# Patient Record
Sex: FEMALE | Race: WHITE | NOT HISPANIC OR LATINO | Employment: UNEMPLOYED | URBAN - METROPOLITAN AREA
[De-identification: names, ages, dates, MRNs, and addresses within clinical notes are randomized per-mention and may not be internally consistent; named-entity substitution may affect disease eponyms.]

---

## 2017-02-03 DIAGNOSIS — J30.9 ALLERGIC RHINITIS: ICD-10-CM

## 2017-02-03 RX ORDER — FLUTICASONE PROPIONATE 50 MCG
SPRAY, SUSPENSION (ML) NASAL
Qty: 48 G | Refills: 3 | Status: SHIPPED | OUTPATIENT
Start: 2017-02-03 | End: 2019-04-08 | Stop reason: SDUPTHER

## 2017-02-20 ENCOUNTER — TELEPHONE (OUTPATIENT)
Dept: INTERNAL MEDICINE | Facility: CLINIC | Age: 57
End: 2017-02-20

## 2017-02-20 DIAGNOSIS — R42 VERTIGO: Primary | ICD-10-CM

## 2017-02-20 RX ORDER — MECLIZINE HYDROCHLORIDE 25 MG/1
25 TABLET ORAL 3 TIMES DAILY PRN
Qty: 50 TABLET | Refills: 2 | Status: SHIPPED | OUTPATIENT
Start: 2017-02-20 | End: 2017-02-20 | Stop reason: SDUPTHER

## 2017-02-20 RX ORDER — MECLIZINE HYDROCHLORIDE 25 MG/1
25 TABLET ORAL 3 TIMES DAILY PRN
Qty: 50 TABLET | Refills: 2 | Status: SHIPPED | OUTPATIENT
Start: 2017-02-20 | End: 2017-11-29

## 2017-02-20 RX ORDER — ONDANSETRON 4 MG/1
4 TABLET, ORALLY DISINTEGRATING ORAL EVERY 6 HOURS PRN
Qty: 12 TABLET | Refills: 1 | Status: SHIPPED | OUTPATIENT
Start: 2017-02-20 | End: 2017-11-29

## 2017-02-20 RX ORDER — ONDANSETRON 4 MG/1
4 TABLET, ORALLY DISINTEGRATING ORAL EVERY 6 HOURS PRN
Qty: 12 TABLET | Refills: 1 | Status: SHIPPED | OUTPATIENT
Start: 2017-02-20 | End: 2017-02-20 | Stop reason: SDUPTHER

## 2017-02-20 NOTE — TELEPHONE ENCOUNTER
----- Message from Brooke Mercer sent at 2/20/2017  8:04 AM CST -----  Contact: Asad [phone]  C/o vomiting and feeling dizzy symptoms started Saturday want to know can you call her something in to [facility] in [place].

## 2017-04-07 DIAGNOSIS — Z12.31 OTHER SCREENING MAMMOGRAM: ICD-10-CM

## 2017-06-27 ENCOUNTER — PATIENT OUTREACH (OUTPATIENT)
Dept: ADMINISTRATIVE | Facility: HOSPITAL | Age: 57
End: 2017-06-27

## 2017-06-27 NOTE — PROGRESS NOTES
Phoned patient to confirm PCP and schedule office visit. Left message asking for return call to confirm pcp and schedule f/u visit.

## 2017-09-01 DIAGNOSIS — E11.9 TYPE 2 DIABETES MELLITUS WITHOUT COMPLICATION: ICD-10-CM

## 2017-09-03 RX ORDER — METFORMIN HYDROCHLORIDE 500 MG/1
TABLET ORAL
Qty: 180 TABLET | Refills: 2 | OUTPATIENT
Start: 2017-09-03

## 2017-11-03 DIAGNOSIS — E03.9 HYPOTHYROIDISM: ICD-10-CM

## 2017-11-03 DIAGNOSIS — Z12.11 COLON CANCER SCREENING: ICD-10-CM

## 2017-11-03 RX ORDER — LEVOTHYROXINE SODIUM 50 UG/1
TABLET ORAL
Qty: 90 TABLET | Refills: 3 | OUTPATIENT
Start: 2017-11-03

## 2017-11-03 RX ORDER — IBUPROFEN 800 MG/1
TABLET ORAL
Qty: 360 TABLET | Refills: 3 | OUTPATIENT
Start: 2017-11-03

## 2017-11-20 ENCOUNTER — TELEPHONE (OUTPATIENT)
Dept: INTERNAL MEDICINE | Facility: CLINIC | Age: 57
End: 2017-11-20

## 2017-11-20 DIAGNOSIS — Z00.00 ROUTINE GENERAL MEDICAL EXAMINATION AT A HEALTH CARE FACILITY: Primary | ICD-10-CM

## 2017-11-20 NOTE — TELEPHONE ENCOUNTER
----- Message from Claudette Banda sent at 11/20/2017  3:28 PM CST -----  Contact: The pt called  She is scheduled to see you on 11-29-17 for her check up.  She is requesting orders be put in so that she can go to the lab on tomorrow morning for the blood work.  Want labs done at [facility]

## 2017-11-29 ENCOUNTER — OFFICE VISIT (OUTPATIENT)
Dept: INTERNAL MEDICINE | Facility: CLINIC | Age: 57
End: 2017-11-29
Payer: COMMERCIAL

## 2017-11-29 VITALS
BODY MASS INDEX: 57.13 KG/M2 | HEART RATE: 80 BPM | HEIGHT: 60 IN | RESPIRATION RATE: 20 BRPM | WEIGHT: 291 LBS | DIASTOLIC BLOOD PRESSURE: 80 MMHG | SYSTOLIC BLOOD PRESSURE: 136 MMHG | TEMPERATURE: 97 F

## 2017-11-29 DIAGNOSIS — Z00.00 ROUTINE GENERAL MEDICAL EXAMINATION AT A HEALTH CARE FACILITY: Primary | ICD-10-CM

## 2017-11-29 DIAGNOSIS — E78.2 MIXED HYPERLIPIDEMIA: ICD-10-CM

## 2017-11-29 DIAGNOSIS — E11.9 TYPE 2 DIABETES MELLITUS WITHOUT COMPLICATION, WITHOUT LONG-TERM CURRENT USE OF INSULIN: ICD-10-CM

## 2017-11-29 DIAGNOSIS — J20.9 ACUTE BRONCHITIS, UNSPECIFIED ORGANISM: ICD-10-CM

## 2017-11-29 DIAGNOSIS — I10 ESSENTIAL HYPERTENSION, BENIGN: ICD-10-CM

## 2017-11-29 DIAGNOSIS — E03.9 HYPOTHYROIDISM, UNSPECIFIED TYPE: ICD-10-CM

## 2017-11-29 PROCEDURE — 90471 IMMUNIZATION ADMIN: CPT | Mod: S$GLB,,, | Performed by: INTERNAL MEDICINE

## 2017-11-29 PROCEDURE — 99999 PR PBB SHADOW E&M-EST. PATIENT-LVL III: CPT | Mod: PBBFAC,,, | Performed by: INTERNAL MEDICINE

## 2017-11-29 PROCEDURE — 99396 PREV VISIT EST AGE 40-64: CPT | Mod: 25,S$GLB,, | Performed by: INTERNAL MEDICINE

## 2017-11-29 PROCEDURE — 90472 IMMUNIZATION ADMIN EACH ADD: CPT | Mod: S$GLB,,, | Performed by: INTERNAL MEDICINE

## 2017-11-29 PROCEDURE — 90732 PPSV23 VACC 2 YRS+ SUBQ/IM: CPT | Mod: S$GLB,,, | Performed by: INTERNAL MEDICINE

## 2017-11-29 PROCEDURE — 90686 IIV4 VACC NO PRSV 0.5 ML IM: CPT | Mod: S$GLB,,, | Performed by: INTERNAL MEDICINE

## 2017-11-29 RX ORDER — PREDNISONE 20 MG/1
TABLET ORAL
Qty: 14 TABLET | Refills: 0 | Status: SHIPPED | OUTPATIENT
Start: 2017-11-29 | End: 2017-12-22 | Stop reason: ALTCHOICE

## 2017-11-29 RX ORDER — LEVOTHYROXINE SODIUM 50 UG/1
50 TABLET ORAL DAILY
Qty: 90 TABLET | Refills: 3 | Status: SHIPPED | OUTPATIENT
Start: 2017-11-29 | End: 2018-11-23 | Stop reason: SDUPTHER

## 2017-11-29 RX ORDER — CODEINE PHOSPHATE AND GUAIFENESIN 10; 100 MG/5ML; MG/5ML
5 SOLUTION ORAL 3 TIMES DAILY PRN
Qty: 180 ML | Refills: 0 | Status: SHIPPED | OUTPATIENT
Start: 2017-11-29 | End: 2017-12-09

## 2017-11-29 RX ORDER — SIMVASTATIN 40 MG/1
40 TABLET, FILM COATED ORAL NIGHTLY
Qty: 90 TABLET | Refills: 3 | Status: SHIPPED | OUTPATIENT
Start: 2017-11-29 | End: 2018-11-23 | Stop reason: SDUPTHER

## 2017-11-29 RX ORDER — LISINOPRIL 20 MG/1
20 TABLET ORAL DAILY
Qty: 90 TABLET | Refills: 3 | Status: SHIPPED | OUTPATIENT
Start: 2017-11-29 | End: 2018-11-23 | Stop reason: SDUPTHER

## 2017-11-29 NOTE — PROGRESS NOTES
Subjective:       Patient ID: Mela Manriquez is a 57 y.o. female.    Chief Complaint: Follow-up; Results; Flu Vaccine; Cough (x3 days); Sinus Problem; and Sore Throat    HPI  Wellness visit.  Labs reviewed.  Pt with 3 days of nasal congestion, coryza and prod cough.  No F/C, SOB.  No pedal paresthesias.  Eye exam, mammo, pap overdue.  Review of Systems   All other systems reviewed and are negative.      Objective:      Physical Exam   Constitutional: She appears well-developed. No distress.   obese   HENT:   Head: Normocephalic.   Eyes: EOM are normal.   Neck: Normal range of motion. No tracheal deviation present.   Cardiovascular: Normal rate, regular rhythm, normal heart sounds and intact distal pulses.    Pulses:       Dorsalis pedis pulses are 2+ on the right side, and 2+ on the left side.        Posterior tibial pulses are 2+ on the right side, and 2+ on the left side.   Pulmonary/Chest: Effort normal. No respiratory distress. She has wheezes (end exp all fields).   Abdominal: Soft. Bowel sounds are normal. She exhibits no distension. There is no tenderness.   Musculoskeletal: Normal range of motion. She exhibits no edema.        Right foot: There is normal range of motion and no deformity.        Left foot: There is normal range of motion and no deformity.   Feet:   Right Foot:   Protective Sensation: 6 sites tested. 6 sites sensed.   Skin Integrity: Positive for callus and dry skin.   Left Foot:   Protective Sensation: 6 sites tested. 6 sites sensed.   Skin Integrity: Positive for callus and dry skin.   Neurological: She is alert. No cranial nerve deficit. She exhibits normal muscle tone. Coordination normal.   Skin: Skin is warm and dry. No rash noted. She is not diaphoretic. No erythema.   Psychiatric: She has a normal mood and affect. Her behavior is normal.   Vitals reviewed.      Assessment:       1. Routine general medical examination at a health care facility    2. Essential hypertension, benign     3. Mixed hyperlipidemia    4. Hypothyroidism, unspecified type    5. Type 2 diabetes mellitus without complication, without long-term current use of insulin    6. Acute bronchitis, unspecified organism        Plan:       Mela was seen today for follow-up, results, flu vaccine, cough, sinus problem and sore throat.    Diagnoses and all orders for this visit:    Routine general medical examination at a health care facility  -     Influenza - Quadrivalent (3 years & older) (PF)  -     Pneumococcal Polysaccharide Vaccine (23 Valent) (SQ/IM)    Essential hypertension, benign  -     lisinopril (PRINIVIL,ZESTRIL) 20 MG tablet; Take 1 tablet (20 mg total) by mouth once daily.   Well-cont    Mixed hyperlipidemia  -     simvastatin (ZOCOR) 40 MG tablet; Take 1 tablet (40 mg total) by mouth every evening.  -     Lipid panel; Future    Hypothyroidism, unspecified type  -     levothyroxine (SYNTHROID) 50 MCG tablet; Take 1 tablet (50 mcg total) by mouth once daily.   Euthyroid    Type 2 diabetes mellitus without complication, without long-term current use of insulin  -     Hemoglobin A1c; Future   D/C metformin    Acute bronchitis, unspecified organism  -     guaifenesin-codeine 100-10 mg/5 ml (CHERATUSSIN AC)  mg/5 mL syrup; Take 5 mLs by mouth 3 (three) times daily as needed for Cough.  -     predniSONE (DELTASONE) 20 MG tablet; 2 tabs po qd x 7 days      Return in about 3 months (around 2/28/2018).

## 2017-12-22 ENCOUNTER — OFFICE VISIT (OUTPATIENT)
Dept: INTERNAL MEDICINE | Facility: CLINIC | Age: 57
End: 2017-12-22
Payer: COMMERCIAL

## 2017-12-22 VITALS
SYSTOLIC BLOOD PRESSURE: 136 MMHG | TEMPERATURE: 98 F | HEART RATE: 88 BPM | HEIGHT: 60 IN | WEIGHT: 293 LBS | DIASTOLIC BLOOD PRESSURE: 78 MMHG | BODY MASS INDEX: 57.52 KG/M2 | RESPIRATION RATE: 20 BRPM

## 2017-12-22 DIAGNOSIS — J06.9 UPPER RESPIRATORY TRACT INFECTION, UNSPECIFIED TYPE: ICD-10-CM

## 2017-12-22 DIAGNOSIS — J06.9 UPPER RESPIRATORY TRACT INFECTION, UNSPECIFIED TYPE: Primary | ICD-10-CM

## 2017-12-22 PROCEDURE — 99999 PR PBB SHADOW E&M-EST. PATIENT-LVL III: CPT | Mod: PBBFAC,,, | Performed by: INTERNAL MEDICINE

## 2017-12-22 PROCEDURE — 99213 OFFICE O/P EST LOW 20 MIN: CPT | Mod: S$GLB,,, | Performed by: INTERNAL MEDICINE

## 2017-12-22 RX ORDER — TOBRAMYCIN 3 MG/ML
SOLUTION/ DROPS OPHTHALMIC
COMMUNITY
Start: 2017-12-18 | End: 2018-03-13

## 2017-12-22 RX ORDER — AMOXICILLIN AND CLAVULANATE POTASSIUM 875; 125 MG/1; MG/1
TABLET, FILM COATED ORAL
COMMUNITY
Start: 2017-12-18 | End: 2018-03-13

## 2017-12-22 RX ORDER — FLUTICASONE PROPIONATE AND SALMETEROL 100; 50 UG/1; UG/1
1 POWDER RESPIRATORY (INHALATION) 2 TIMES DAILY
Qty: 180 EACH | Refills: 3 | Status: SHIPPED | OUTPATIENT
Start: 2017-12-22 | End: 2017-12-22

## 2017-12-22 RX ORDER — FLUTICASONE PROPIONATE 110 UG/1
1 AEROSOL, METERED RESPIRATORY (INHALATION) 2 TIMES DAILY
Qty: 12 G | Refills: 2 | Status: SHIPPED | OUTPATIENT
Start: 2017-12-22 | End: 2018-03-13

## 2017-12-22 RX ORDER — FLUTICASONE PROPIONATE AND SALMETEROL 100; 50 UG/1; UG/1
1 POWDER RESPIRATORY (INHALATION) 2 TIMES DAILY
Qty: 60 EACH | Refills: 3 | Status: SHIPPED | OUTPATIENT
Start: 2017-12-22 | End: 2017-12-22 | Stop reason: SDUPTHER

## 2017-12-22 NOTE — PROGRESS NOTES
Subjective:       Patient ID: Mela Manriquez is a 57 y.o. female.    Chief Complaint: Follow-up (recently seen at urgent care for bronchitis)    HPI  Pt with 2 weeks of nasal congestion, coryza, prod cough.  No f/C, SOB.  No response to Augmentin, but got diarrhea from it.  Review of Systems    Objective:      Physical Exam   Constitutional: She appears well-developed. No distress.   obese   HENT:   Head: Normocephalic.   Mouth/Throat: Oropharynx is clear and moist.   L max sinus tender   Eyes: EOM are normal.   Neck: Normal range of motion. No tracheal deviation present.   Cardiovascular: Normal rate, regular rhythm, normal heart sounds and intact distal pulses.    Pulmonary/Chest: Effort normal. No respiratory distress. She has wheezes (scattered).   Abdominal: She exhibits no distension.   Musculoskeletal: Normal range of motion. She exhibits no edema.   Neurological: She is alert. No cranial nerve deficit. She exhibits normal muscle tone. Coordination normal.   Skin: Skin is warm and dry. No rash noted. She is not diaphoretic. No erythema.   Psychiatric: She has a normal mood and affect. Her behavior is normal.   Vitals reviewed.      Assessment:       1. Upper respiratory tract infection, unspecified type        Plan:       Mela was seen today for follow-up.    Diagnoses and all orders for this visit:    Upper respiratory tract infection, unspecified type  -     fluticasone-salmeterol 100-50 mcg/dose (ADVAIR) 100-50 mcg/dose diskus inhaler; Inhale 1 puff into the lungs 2 (two) times daily.      Return if symptoms worsen or fail to improve.

## 2018-03-13 ENCOUNTER — TELEPHONE (OUTPATIENT)
Dept: INTERNAL MEDICINE | Facility: CLINIC | Age: 58
End: 2018-03-13

## 2018-03-13 ENCOUNTER — OFFICE VISIT (OUTPATIENT)
Dept: INTERNAL MEDICINE | Facility: CLINIC | Age: 58
End: 2018-03-13
Payer: COMMERCIAL

## 2018-03-13 VITALS
HEART RATE: 76 BPM | RESPIRATION RATE: 16 BRPM | BODY MASS INDEX: 57.07 KG/M2 | WEIGHT: 292.25 LBS | SYSTOLIC BLOOD PRESSURE: 136 MMHG | DIASTOLIC BLOOD PRESSURE: 84 MMHG | TEMPERATURE: 98 F

## 2018-03-13 DIAGNOSIS — M79.7 FIBROMYALGIA: ICD-10-CM

## 2018-03-13 DIAGNOSIS — J30.1 CHRONIC SEASONAL ALLERGIC RHINITIS DUE TO POLLEN: ICD-10-CM

## 2018-03-13 DIAGNOSIS — E66.01 MORBID OBESITY, UNSPECIFIED OBESITY TYPE: ICD-10-CM

## 2018-03-13 DIAGNOSIS — Z00.00 ROUTINE GENERAL MEDICAL EXAMINATION AT A HEALTH CARE FACILITY: Primary | ICD-10-CM

## 2018-03-13 DIAGNOSIS — I10 ESSENTIAL HYPERTENSION, BENIGN: ICD-10-CM

## 2018-03-13 DIAGNOSIS — M79.7 FIBROMYALGIA: Primary | ICD-10-CM

## 2018-03-13 DIAGNOSIS — E03.9 HYPOTHYROIDISM, UNSPECIFIED TYPE: ICD-10-CM

## 2018-03-13 DIAGNOSIS — E78.2 MIXED HYPERLIPIDEMIA: ICD-10-CM

## 2018-03-13 DIAGNOSIS — R73.03 PREDIABETES: ICD-10-CM

## 2018-03-13 PROCEDURE — 99396 PREV VISIT EST AGE 40-64: CPT | Mod: 25,S$GLB,, | Performed by: INTERNAL MEDICINE

## 2018-03-13 PROCEDURE — 90715 TDAP VACCINE 7 YRS/> IM: CPT | Mod: S$GLB,,, | Performed by: INTERNAL MEDICINE

## 2018-03-13 PROCEDURE — 99999 PR PBB SHADOW E&M-EST. PATIENT-LVL III: CPT | Mod: PBBFAC,,, | Performed by: INTERNAL MEDICINE

## 2018-03-13 PROCEDURE — 90471 IMMUNIZATION ADMIN: CPT | Mod: S$GLB,,, | Performed by: INTERNAL MEDICINE

## 2018-03-13 RX ORDER — AMOXICILLIN 500 MG
1 CAPSULE ORAL DAILY
COMMUNITY

## 2018-03-13 RX ORDER — DULOXETIN HYDROCHLORIDE 60 MG/1
60 CAPSULE, DELAYED RELEASE ORAL DAILY
Qty: 90 CAPSULE | Refills: 4 | Status: SHIPPED | OUTPATIENT
Start: 2018-03-13 | End: 2018-09-27

## 2018-03-13 RX ORDER — CEPHALEXIN 250 MG/1
CAPSULE ORAL
COMMUNITY
Start: 2018-03-08 | End: 2018-12-03

## 2018-03-13 NOTE — TELEPHONE ENCOUNTER
----- Message from Gracie Hart sent at 3/13/2018 12:18 PM CDT -----  Contact: [phone] ref [id number] [facility]  Pharmacy would like to speak with you about changing pt's medication milnacipran (SAVELLA) 25 mg Tab tablet. Please advise.

## 2018-03-20 ENCOUNTER — PATIENT MESSAGE (OUTPATIENT)
Dept: INTERNAL MEDICINE | Facility: CLINIC | Age: 58
End: 2018-03-20

## 2018-05-04 RX ORDER — IBUPROFEN 800 MG/1
800 TABLET ORAL 3 TIMES DAILY
Qty: 360 TABLET | Refills: 3 | Status: ON HOLD | OUTPATIENT
Start: 2018-05-04 | End: 2018-11-30 | Stop reason: SDUPTHER

## 2018-06-08 DIAGNOSIS — Z12.39 BREAST CANCER SCREENING: ICD-10-CM

## 2018-09-18 ENCOUNTER — PATIENT MESSAGE (OUTPATIENT)
Dept: INTERNAL MEDICINE | Facility: CLINIC | Age: 58
End: 2018-09-18

## 2018-09-27 ENCOUNTER — OFFICE VISIT (OUTPATIENT)
Dept: INTERNAL MEDICINE | Facility: CLINIC | Age: 58
End: 2018-09-27
Payer: COMMERCIAL

## 2018-09-27 VITALS
SYSTOLIC BLOOD PRESSURE: 130 MMHG | BODY MASS INDEX: 59.18 KG/M2 | WEIGHT: 293 LBS | DIASTOLIC BLOOD PRESSURE: 80 MMHG | HEART RATE: 88 BPM

## 2018-09-27 DIAGNOSIS — E78.2 MIXED HYPERLIPIDEMIA: ICD-10-CM

## 2018-09-27 DIAGNOSIS — Z00.00 ROUTINE GENERAL MEDICAL EXAMINATION AT A HEALTH CARE FACILITY: ICD-10-CM

## 2018-09-27 DIAGNOSIS — Z23 NEED FOR PROPHYLACTIC VACCINATION AND INOCULATION AGAINST INFLUENZA: ICD-10-CM

## 2018-09-27 DIAGNOSIS — M79.7 FIBROMYALGIA: Primary | ICD-10-CM

## 2018-09-27 DIAGNOSIS — E03.9 HYPOTHYROIDISM, UNSPECIFIED TYPE: ICD-10-CM

## 2018-09-27 DIAGNOSIS — I10 ESSENTIAL HYPERTENSION, BENIGN: ICD-10-CM

## 2018-09-27 DIAGNOSIS — J30.1 CHRONIC SEASONAL ALLERGIC RHINITIS DUE TO POLLEN: ICD-10-CM

## 2018-09-27 DIAGNOSIS — R73.03 PREDIABETES: ICD-10-CM

## 2018-09-27 DIAGNOSIS — E66.01 MORBID OBESITY, UNSPECIFIED OBESITY TYPE: ICD-10-CM

## 2018-09-27 PROCEDURE — 90471 IMMUNIZATION ADMIN: CPT | Mod: S$GLB,,, | Performed by: INTERNAL MEDICINE

## 2018-09-27 PROCEDURE — 99214 OFFICE O/P EST MOD 30 MIN: CPT | Mod: 25,S$GLB,, | Performed by: INTERNAL MEDICINE

## 2018-09-27 PROCEDURE — 3079F DIAST BP 80-89 MM HG: CPT | Mod: CPTII,S$GLB,, | Performed by: INTERNAL MEDICINE

## 2018-09-27 PROCEDURE — 3008F BODY MASS INDEX DOCD: CPT | Mod: CPTII,S$GLB,, | Performed by: INTERNAL MEDICINE

## 2018-09-27 PROCEDURE — 99999 PR PBB SHADOW E&M-EST. PATIENT-LVL III: CPT | Mod: PBBFAC,,, | Performed by: INTERNAL MEDICINE

## 2018-09-27 PROCEDURE — 90686 IIV4 VACC NO PRSV 0.5 ML IM: CPT | Mod: S$GLB,,, | Performed by: INTERNAL MEDICINE

## 2018-09-27 PROCEDURE — 3075F SYST BP GE 130 - 139MM HG: CPT | Mod: CPTII,S$GLB,, | Performed by: INTERNAL MEDICINE

## 2018-09-27 RX ORDER — METHYLPREDNISOLONE 4 MG/1
TABLET ORAL
Qty: 1 PACKAGE | Refills: 0 | Status: SHIPPED | OUTPATIENT
Start: 2018-09-27 | End: 2018-10-22

## 2018-09-27 RX ORDER — METHYLPREDNISOLONE 4 MG/1
TABLET ORAL
Qty: 1 PACKAGE | Refills: 0 | Status: SHIPPED | OUTPATIENT
Start: 2018-09-27 | End: 2018-09-27 | Stop reason: SDUPTHER

## 2018-09-27 NOTE — PROGRESS NOTES
Subjective:       Patient ID: Mela Manriquez is a 58 y.o. female.    Chief Complaint: Follow-up    HPI  Checkup.  Labs reviewed.  Still with fibrositis pain, intolerant of Cymbalta.  C/O nasal congestion, hoarseness, cough x 2 weeks, no F/C.  Review of Systems   All other systems reviewed and are negative.      Objective:      Physical Exam   Constitutional: She appears well-developed. No distress.   obese   HENT:   Head: Normocephalic.   Nasal mucosa inflamed   Eyes: EOM are normal.   Neck: Normal range of motion. No tracheal deviation present.   Cardiovascular: Normal rate, regular rhythm, normal heart sounds and intact distal pulses.   Pulmonary/Chest: Effort normal and breath sounds normal. No respiratory distress.   Abdominal: She exhibits no distension.   Musculoskeletal: Normal range of motion. She exhibits no edema.   Neurological: She is alert. No cranial nerve deficit. She exhibits normal muscle tone. Coordination normal.   Skin: Skin is warm and dry. No rash noted. She is not diaphoretic. No erythema.   Psychiatric: She has a normal mood and affect. Her behavior is normal.   Vitals reviewed.      Assessment:       1. Fibromyalgia    2. Essential hypertension, benign    3. Hypothyroidism, unspecified type    4. Mixed hyperlipidemia    5. Morbid obesity, unspecified obesity type    6. Chronic seasonal allergic rhinitis due to pollen    7. Prediabetes    8. Need for prophylactic vaccination and inoculation against influenza    9. Routine general medical examination at a health care facility        Plan:       Mela was seen today for follow-up.    Diagnoses and all orders for this visit:    Fibromyalgia  -     milnacipran (SAVELLA) 25 mg Tab tablet; Take 1 tablet (25 mg total) by mouth 2 (two) times daily.    Essential hypertension, benign   Well-cont    Hypothyroidism, unspecified type   Euthyroid    Mixed hyperlipidemia  -     Lipid panel; Future    Morbid obesity, unspecified obesity type  -      Ambulatory Referral to [facility]    Chronic seasonal allergic rhinitis due to pollen  -     methylPREDNISolone (MEDROL DOSEPACK) 4 mg tablet; use as directed    Prediabetes  -     Hemoglobin A1c; Future    Need for prophylactic vaccination and inoculation against influenza  -     Influenza - Quadrivalent (3 years & older) (PF)    Routine general medical examination at a health care facility  -     Ambulatory referral to Gynecology      Follow-up in about 6 months (around 3/27/2019).

## 2018-10-02 ENCOUNTER — TELEPHONE (OUTPATIENT)
Dept: INTERNAL MEDICINE | Facility: CLINIC | Age: 58
End: 2018-10-02

## 2018-10-02 DIAGNOSIS — M79.7 FIBROMYALGIA: Primary | ICD-10-CM

## 2018-10-02 RX ORDER — GABAPENTIN 300 MG/1
300 CAPSULE ORAL 3 TIMES DAILY
Qty: 90 CAPSULE | Refills: 3 | Status: SHIPPED | OUTPATIENT
Start: 2018-10-02 | End: 2018-11-19 | Stop reason: CLARIF

## 2018-10-02 NOTE — TELEPHONE ENCOUNTER
----- Message from Hilton Sales sent at 10/2/2018  3:14 PM CDT -----  Contact: donavon arauz [phone] ref [phone]  Pharmacist advised the milnacipran (SAVELLA) 25 mg Tab tablet is not covered under the patient insurance and advised the following are    Paroxetine HCL  Gabapentin Caps or Tabs  Citalopram HBR  Sertraline hcl  Paroxetine HCL CR TABS  Escitalopram Tabs

## 2018-10-03 ENCOUNTER — HOSPITAL ENCOUNTER (OUTPATIENT)
Dept: RADIOLOGY | Facility: HOSPITAL | Age: 58
Discharge: HOME | End: 2018-10-03
Attending: INTERNAL MEDICINE
Payer: COMMERCIAL

## 2018-10-03 ENCOUNTER — TELEPHONE (OUTPATIENT)
Dept: FAMILY MEDICINE | Facility: CLINIC | Age: 58
End: 2018-10-03

## 2018-10-03 ENCOUNTER — PROCEDURE VISIT (OUTPATIENT)
Dept: OBSTETRICS AND GYNECOLOGY | Facility: CLINIC | Age: 58
End: 2018-10-03
Payer: COMMERCIAL

## 2018-10-03 ENCOUNTER — TELEPHONE (OUTPATIENT)
Dept: INTERNAL MEDICINE | Facility: CLINIC | Age: 58
End: 2018-10-03

## 2018-10-03 ENCOUNTER — OFFICE VISIT (OUTPATIENT)
Dept: OBSTETRICS AND GYNECOLOGY | Facility: CLINIC | Age: 58
End: 2018-10-03
Payer: COMMERCIAL

## 2018-10-03 VITALS
WEIGHT: 293 LBS | SYSTOLIC BLOOD PRESSURE: 150 MMHG | HEIGHT: 60 IN | BODY MASS INDEX: 57.52 KG/M2 | DIASTOLIC BLOOD PRESSURE: 94 MMHG

## 2018-10-03 DIAGNOSIS — Z12.4 PAP SMEAR FOR CERVICAL CANCER SCREENING: ICD-10-CM

## 2018-10-03 DIAGNOSIS — Z12.39 BREAST CANCER SCREENING: ICD-10-CM

## 2018-10-03 DIAGNOSIS — N95.0 PMB (POSTMENOPAUSAL BLEEDING): ICD-10-CM

## 2018-10-03 DIAGNOSIS — Z01.419 ENCOUNTER FOR ANNUAL ROUTINE GYNECOLOGICAL EXAMINATION: Primary | ICD-10-CM

## 2018-10-03 PROCEDURE — 88175 CYTOPATH C/V AUTO FLUID REDO: CPT | Performed by: PATHOLOGY

## 2018-10-03 PROCEDURE — 88305 TISSUE EXAM BY PATHOLOGIST: CPT | Performed by: PATHOLOGY

## 2018-10-03 PROCEDURE — 3080F DIAST BP >= 90 MM HG: CPT | Mod: CPTII,S$GLB,, | Performed by: OBSTETRICS & GYNECOLOGY

## 2018-10-03 PROCEDURE — 88305 TISSUE EXAM BY PATHOLOGIST: CPT | Mod: 26,,, | Performed by: PATHOLOGY

## 2018-10-03 PROCEDURE — 99386 PREV VISIT NEW AGE 40-64: CPT | Mod: 25,S$GLB,, | Performed by: OBSTETRICS & GYNECOLOGY

## 2018-10-03 PROCEDURE — 77067 SCR MAMMO BI INCL CAD: CPT | Mod: 26,,, | Performed by: RADIOLOGY

## 2018-10-03 PROCEDURE — 76830 TRANSVAGINAL US NON-OB: CPT | Mod: S$GLB,,, | Performed by: OBSTETRICS & GYNECOLOGY

## 2018-10-03 PROCEDURE — 77063 BREAST TOMOSYNTHESIS BI: CPT | Mod: TC

## 2018-10-03 PROCEDURE — 77063 BREAST TOMOSYNTHESIS BI: CPT | Mod: 26,,, | Performed by: RADIOLOGY

## 2018-10-03 PROCEDURE — 3077F SYST BP >= 140 MM HG: CPT | Mod: CPTII,S$GLB,, | Performed by: OBSTETRICS & GYNECOLOGY

## 2018-10-03 PROCEDURE — 58100 BIOPSY OF UTERUS LINING: CPT | Mod: S$GLB,,, | Performed by: OBSTETRICS & GYNECOLOGY

## 2018-10-03 PROCEDURE — 87624 HPV HI-RISK TYP POOLED RSLT: CPT

## 2018-10-03 PROCEDURE — 99999 PR PBB SHADOW E&M-EST. PATIENT-LVL III: CPT | Mod: PBBFAC,,, | Performed by: OBSTETRICS & GYNECOLOGY

## 2018-10-03 PROCEDURE — 88141 CYTOPATH C/V INTERPRET: CPT | Mod: ,,, | Performed by: PATHOLOGY

## 2018-10-03 NOTE — PROGRESS NOTES
Chief Complaint   Patient presents with    Well Woman       HISTORY OF PRESENT ILLNESS:   Mela Manriquez is a 58 y.o. female  who presents for well woman exam.  No LMP recorded. Patient is postmenopausal.. Menopause at age 48  She has complains of vaginal bleeding that has been going on and off since monday. Sometimes it is heavy with clots. Seems to only happen with urination. She has a history of kidney stones but isn't having pain now. Had normal colonoscopy 5 years ago.      Past Medical History:   Diagnosis Date    AR (allergic rhinitis)     Benign essential hypertension     Fibromyalgia     High cholesterol     Hypothyroidism           Past Surgical History:   Procedure Laterality Date    APPENDECTOMY      CHOLECYSTECTOMY      COLONOSCOPY  2013    SINUS SURGERY      TONSILLECTOMY           Social History     Socioeconomic History    Marital status:      Spouse name: Not on file    Number of children: Not on file    Years of education: Not on file    Highest education level: Not on file   Social Needs    Financial resource strain: Not on file    Food insecurity - worry: Not on file    Food insecurity - inability: Not on file    Transportation needs - medical: Not on file    Transportation needs - non-medical: Not on file   Occupational History    Not on file   Tobacco Use    Smoking status: Never Smoker    Smokeless tobacco: Never Used   Substance and Sexual Activity    Alcohol use: No    Drug use: No    Sexual activity: Yes     Partners: Male   Other Topics Concern    Not on file   Social History Narrative    Not on file       Family History   Problem Relation Age of Onset    Diabetes Mother     Diabetes Brother     Diabetes Paternal Grandfather          OB History    Para Term  AB Living   2 2 2     2   SAB TAB Ectopic Multiple Live Births                  # Outcome Date GA Lbr Johnny/2nd Weight Sex Delivery Anes PTL Lv   2 Term            1 Term                    COMPREHENSIVE GYN HISTORY:  PAP History: Denies abnormal Paps but last one was 10 years ago   Infection History: Denies STDs. Denies PID.  Benign History: Denies uterine fibroids. Denies ovarian cysts. Denies endometriosis Denies other conditions.  Cancer History: Denies cervical cancer. Denies uterine cancer or hyperplasia. Denies ovarian cancer. Denies vulvar cancer or pre-cancer. Denies vaginal cancer or pre-cancer. Denies breast cancer. Denies colon cancer.  Cycle: 12/mon.4-7 days     ROS:  GENERAL: Denies weight gain or weight loss. Feeling well overall.   SKIN: Denies rash or lesions.   HEAD: Denies headache.   NODES: Denies enlarged lymph nodes.   CHEST: Denies shortness of breath.   ABDOMEN: No abdominal pain, constipation, diarrhea, nausea, vomiting or rectal bleeding.   URINARY: No frequency, dysuria, hematuria, or burning on urination.  REPRODUCTIVE: See HPI.   BREASTS: The patient denies pain, lumps, or nipple discharge.       BP (!) 150/94   Ht 5' (1.524 m)   Wt (!) 137 kg (302 lb)   BMI 58.98 kg/m²     APPEARANCE: Well nourished, well developed, in no acute distress.  NECK: Neck symmetric without  thyromegaly.  NODES: No inguinal, cervical lymph node enlargement.  CHEST: Lungs clear to auscultation.  HEART: Regular rate and rhythm, no murmurs, rubs or gallops.  ABDOMEN: Soft. No tenderness or masses. No hernias. No hepatosplenomegaly.  BREASTS: Symmetrical, no skin changes or visible lesions. No palpable masses, nipple discharge or adenopathy bilaterally.  PELVIC: very limited exam due to habitus  VULVA: No lesions. Normal female genitalia.  URETHRAL MEATUS: Normal size and location, no lesions, no prolapse.  URETHRA: No masses, tenderness, prolapse or scarring.  VAGINA: Moist and well rugated, no discharge, no significant cystocele or rectocele.  CERVIX: No lesions and discharge. Moderate amount of watery blood and clot in the vault. Minimal active bleeding. No gross masses on the  cervix or polyps  UTERUS: couldn't appreciate  ADNEXA: No masses or tenderness.  PERINEUM: Normal, mo masses    Data Reviewed:     Lipid profile: Date:   Lab Results   Component Value Date    CHOL 201 (H) 2018    CHOL 185 2016    CHOL 212 (H) 2016     Lab Results   Component Value Date    HDL 45 2018    HDL 44 2016    HDL 48 2016     Lab Results   Component Value Date    LDLCALC 111.6 2018    LDLCALC 112.0 2016    LDLCALC 131.4 2016     Lab Results   Component Value Date    TRIG 222 (H) 2018    TRIG 145 2016    TRIG 163 (H) 2016     Lab Results   Component Value Date    CHOLHDL 22.4 2018    CHOLHDL 23.8 2016    CHOLHDL 22.6 2016     TSH:   Lab Results   Component Value Date    TSH 2.240 2018     Colonoscopy Date:     1. Encounter for annual routine gynecological examination    2. Pap smear for cervical cancer screening    3. PMB (postmenopausal bleeding)        A/P 1. Routine gyn annual exam. s/p normal breast exam and MMG ordered.  Pap with HPV cotesting ordered but discussed with her may need to repeat depending on sample.   2. Discussed the multiple etiology of PMB. We discussed the most common cause is atrophy of the uterine lining but we must evaluate for more serious causes like uterine cancer. I discussed with her that with the amount of bleeding I am concerned this is something serious like cancer or precancerous cells. We will get a TVUS and do EMB afterwards. We discussed the possibility of needing a D&C for diagnosis. Will also check a CBC.      US this afternoon showed 11x6x6 cm uterus with 3 cm EMS; Discussed with patient that we should do EMB but if no tissue sample then should do D&C as this is very concerning.     Date:10/3/2018  Time:2:22 PM  Name of the procedure: Endometrial Biopsy  Indications: Mela Manriquez is a 58 y.o. female  who presents today for endometrial biopsy secondary to  AUB.  No LMP recorded (lmp unknown). Patient is postmenopausal..    Patient consent: Risks/benefits of the procedure were discussed with the patient. Patient's questions were answered.  Consents signed.   TIME OUT completed.  Labs:   Office urine pregnancy test negative  Procedure: Speculum placed in vagina;  cervix swabbed with betadine x 2;  Utreus sounded to 6 cm.   Endometrial pipelle advanced without difficulty x 3 passes and adequate tissue obtained vs clot. Single tooth tenaculum removed.  Hemostasis achieved.    Complications: None  EBL: 5 cc  Disposition: Pt tolerated the procedure well.    A/P:   -Patient instructed to contact MD or report to emergency room for fever greater than 100.4F, vaginal bleeding greater than 2 pads/hour, severe abdominal pain not relieved with NSAIDs.

## 2018-10-03 NOTE — LETTER
October 3, 2018      Shahriar Desai MD  [address]  [address]  [place] [address]           [facility]  [address], [address]  [address] [facility]  [place] [address]  Phone: [phone]          Patient: Mela Manriquez   MR Number: [id number]   YOB: 1960   Date of Visit: 10/3/2018       Dear Dr. Shahriar Desai:    Thank you for referring Mela Manriquez to me for evaluation. Attached you will find relevant portions of my assessment and plan of care.    If you have questions, please do not hesitate to call me. I look forward to following Mela Manriquez along with you.    Sincerely,    Karma Call MD    Enclosure  CC:  No Recipients    If you would like to receive this communication electronically, please contact [email or web address] or [phone] to request more information on [employer] Link access.    For providers and/or their staff who would like to refer a patient to [facility], please contact us through our one-stop-shop provider referral line, [facility], at [phone].    If you feel you have received this communication in error or would no longer like to receive these types of communications, please e-mail [email or web address]

## 2018-10-03 NOTE — TELEPHONE ENCOUNTER
Spoke with pharmacy; clarified that Gabapentin was ordered to replace Savella. (Savella not covered by insurance)      ----- Message from Hilton Sales sent at 10/3/2018  3:24 PM CDT -----  Contact: donavon arauz [phone] ref [id number]  Pharmacist advised they can not hold the Rx pass today and need an answer by end of business today. Please see message from yesterday. Please call.

## 2018-10-09 ENCOUNTER — TELEPHONE (OUTPATIENT)
Dept: OBSTETRICS AND GYNECOLOGY | Facility: CLINIC | Age: 58
End: 2018-10-09

## 2018-10-09 DIAGNOSIS — C55 MALIGNANT NEOPLASM OF UTERUS, UNSPECIFIED SITE: Primary | ICD-10-CM

## 2018-10-09 NOTE — TELEPHONE ENCOUNTER
----- Message from Dolores Borges sent at 10/9/2018  4:18 PM CDT -----  Contact: [phone]/self  Patient states she's returning your call  Please call and advise

## 2018-10-09 NOTE — TELEPHONE ENCOUNTER
Pt calling back, states that the message said something about changing the GYN/ONC appt. Please advise

## 2018-10-09 NOTE — TELEPHONE ENCOUNTER
Called patient on the home and cell phone. Left a message with her  and on her cell phone. Her biopsy came back cancer. I would like to discuss this with her.  I sent a referral for gyn oncology so they should be calling her.

## 2018-10-09 NOTE — TELEPHONE ENCOUNTER
Called patient and we reviewed results. All questions answered. Sent a referral to gyn oncology and scheduled her an appointment.

## 2018-10-10 LAB
HPV HR 12 DNA CVX QL NAA+PROBE: NEGATIVE
HPV16 AG SPEC QL: NEGATIVE
HPV18 DNA SPEC QL NAA+PROBE: NEGATIVE

## 2018-10-10 NOTE — TELEPHONE ENCOUNTER
Spoke with pt. Pt was informed that gyn/onc would be reaching out to her. Pt was given the phone number to the department and will call if she has not heard anything in a couple of days.

## 2018-10-12 ENCOUNTER — TELEPHONE (OUTPATIENT)
Dept: GYNECOLOGIC ONCOLOGY | Facility: CLINIC | Age: 58
End: 2018-10-12

## 2018-10-19 ENCOUNTER — TELEPHONE (OUTPATIENT)
Dept: GYNECOLOGIC ONCOLOGY | Facility: CLINIC | Age: 58
End: 2018-10-19

## 2018-10-21 NOTE — PROGRESS NOTES
Subjective:      Patient ID: Mela Manriquez is a 58 y.o. female.    Chief Complaint: Malignant neoplasm of uterus (Consult)      HPI     Ms. Manriquez is a 58yr old para 2 (vag del x 2) referred from Dr. Call for Gr 1 endometrial cancer found on EMBx. Patient reports LMP at age 48, started bleeding on 10/1/2018 sometimes heavy with clots.    Pelvic US   Uterus 11 x 6 x 6cm with NATALIE 3.3cm  Bilateral ovaries not visualized    EMBx sound to 6cm, Endometrial adenocarcinoma, endometrioid type, FIGO grade 1.    Last pap smear 10/3/2018 Atypical glandular cells, favor a neoplastic process. No prior history for abnormal pap smears.    Surgical history: open appendectomy and cholecystectomy, 1976    Family history negative for breast, ovarian or colon CA. Niece - uterine vs cervical CA (30s), another niece - bone CA (24)    Review of Systems   Constitutional: Negative for appetite change (decreased w stress of diagnosis), chills, diaphoresis, fatigue, fever and unexpected weight change.   HENT: Negative for mouth sores and tinnitus.    Respiratory: Positive for chest tightness (chronic bronchitis). Negative for cough, shortness of breath and wheezing.    Cardiovascular: Negative for chest pain, palpitations and leg swelling.   Gastrointestinal: Negative for abdominal distention, abdominal pain, blood in stool, constipation, diarrhea, nausea and vomiting.   Genitourinary: Positive for vaginal bleeding (since 10/1). Negative for difficulty urinating, dysuria, flank pain, frequency, hematuria, pelvic pain, vaginal discharge and vaginal pain.   Musculoskeletal: Negative for arthralgias and back pain.   Skin: Negative for color change and rash.   Neurological: Negative for dizziness, weakness, numbness and headaches.   Hematological: Negative for adenopathy.   Psychiatric/Behavioral: Negative for confusion and sleep disturbance. The patient is nervous/anxious.        Past Medical History:   Diagnosis Date    AR (allergic  rhinitis)     Benign essential hypertension     Endometrial cancer, grade I 10/22/2018    Fibromyalgia     High cholesterol     Hypothyroidism      Past Surgical History:   Procedure Laterality Date    APPENDECTOMY  1976    open w cholecystectomy    CHOLECYSTECTOMY  1976    open    COLONOSCOPY  2013    SINUS SURGERY  1995    TONSILLECTOMY       Family History   Problem Relation Age of Onset    Diabetes Mother     Diabetes Brother     Diabetes Paternal Grandfather      Social History     Socioeconomic History    Marital status:      Spouse name: Not on file    Number of children: Not on file    Years of education: Not on file    Highest education level: Not on file   Social Needs    Financial resource strain: Not on file    Food insecurity - worry: Not on file    Food insecurity - inability: Not on file    Transportation needs - medical: Not on file    Transportation needs - non-medical: Not on file   Occupational History    Not on file   Tobacco Use    Smoking status: Never Smoker    Smokeless tobacco: Never Used   Substance and Sexual Activity    Alcohol use: No    Drug use: No    Sexual activity: Yes     Partners: Male   Other Topics Concern    Not on file   Social History Narrative    Not on file     Current Outpatient Medications   Medication Sig    ADVAIR DISKUS 100-50 mcg/dose diskus inhaler     cetirizine (ZYRTEC) 10 MG tablet Take 10 mg by mouth once daily.    fish oil-omega-3 fatty acids 300-1,000 mg capsule Take 1 capsule by mouth once daily.    fluticasone (FLONASE) 50 mcg/actuation nasal spray USE 1 SPRAY NASALLY DAILY    gabapentin (NEURONTIN) 300 MG capsule Take 1 capsule (300 mg total) by mouth 3 (three) times daily.    ibuprofen (ADVIL,MOTRIN) 800 MG tablet Take 1 tablet (800 mg total) by mouth 3 (three) times daily.    levothyroxine (SYNTHROID) 50 MCG tablet Take 1 tablet (50 mcg total) by mouth once daily.    lisinopril (PRINIVIL,ZESTRIL) 20 MG tablet  Take 1 tablet (20 mg total) by mouth once daily.    MULTIVIT-IRON-MIN-FOLIC ACID 3,500-18-0.4 UNIT-MG-MG ORAL CHEW Take by mouth.    simvastatin (ZOCOR) 40 MG tablet Take 1 tablet (40 mg total) by mouth every evening.     No current facility-administered medications for this visit.    /63   Pulse 74   Ht 5' (1.524 m)   Wt (!) 136.9 kg (301 lb 13 oz)   LMP  (LMP Unknown)   BMI 58.94 kg/m²     Review of patient's allergies indicates:   Allergen Reactions    Cymbalta [duloxetine] Nausea Only    Azithromycin Diarrhea    Ceclor [cefaclor] Rash       Objective:   Physical Exam:   Constitutional: She is oriented to person, place, and time. She appears well-developed and well-nourished. No distress.    HENT:   Head: Normocephalic and atraumatic.    Eyes: No scleral icterus.    Neck: Normal range of motion.    Cardiovascular: Exam reveals no cyanosis and no edema.     Pulmonary/Chest: Effort normal. No respiratory distress. She exhibits no tenderness.        Abdominal: Soft. Normal appearance. She exhibits no distension, no fluid wave, no ascites and no mass. There is no tenderness. There is no rigidity, no rebound and no guarding. No hernia.         Genitourinary: Uterus normal. Pelvic exam was performed with patient supine. There is no rash, tenderness or lesion on the right labia. There is no rash, tenderness or lesion on the left labia. Uterus is not tender. Cervix is normal. Right adnexum displays no mass, no tenderness and no fullness. Left adnexum displays no mass, no tenderness and no fullness. There is bleeding (scant mucus and blood) in the vagina. No vaginal discharge found.   Genitourinary Comments: Exam limited by habitus           Musculoskeletal: Normal range of motion and moves all extremeties. She exhibits no edema.      Lymphadenopathy:        Right: No inguinal adenopathy present.        Left: No inguinal adenopathy present.    Neurological: She is alert and oriented to person, place, and  time.    Skin: Skin is warm and dry. No rash noted. No cyanosis or erythema. No pallor.    Psychiatric: She has a normal mood and affect. Thought content normal.       Assessment:     1. Morbid obesity    2. Endometrial cancer, grade I        Plan:       - EMBx pathology and today's exam reviewed with patient and   - recommend RALH BSO possible staging pending intraoperative findings/ability to access lymph nodes with comordities and habitus  - The risks, benefits, and indications for surgery were discussed with the patient. These included bleeding, infection, damage to surrounding tissues, the possibility of bowel or urologic resection and reconstruction, and the possibility of major complications including death. She voiced understanding, all questions were answered and consents were signed.    Surgery scheduled for 11/30/18 [facility].   Pre op anesthesia.     Patient seen and examined with Eufemia Ching NP.

## 2018-10-22 ENCOUNTER — INITIAL CONSULT (OUTPATIENT)
Dept: GYNECOLOGIC ONCOLOGY | Facility: CLINIC | Age: 58
End: 2018-10-22
Payer: COMMERCIAL

## 2018-10-22 ENCOUNTER — TELEPHONE (OUTPATIENT)
Dept: GYNECOLOGIC ONCOLOGY | Facility: CLINIC | Age: 58
End: 2018-10-22

## 2018-10-22 VITALS
HEIGHT: 60 IN | SYSTOLIC BLOOD PRESSURE: 134 MMHG | WEIGHT: 293 LBS | DIASTOLIC BLOOD PRESSURE: 63 MMHG | HEART RATE: 74 BPM | BODY MASS INDEX: 57.52 KG/M2

## 2018-10-22 DIAGNOSIS — E66.01 MORBID OBESITY: Primary | ICD-10-CM

## 2018-10-22 DIAGNOSIS — C54.1 ENDOMETRIAL CANCER: Primary | ICD-10-CM

## 2018-10-22 DIAGNOSIS — C54.1 ENDOMETRIAL CANCER, GRADE I: ICD-10-CM

## 2018-10-22 PROCEDURE — 99999 PR PBB SHADOW E&M-EST. PATIENT-LVL III: CPT | Mod: PBBFAC,,, | Performed by: OBSTETRICS & GYNECOLOGY

## 2018-10-22 PROCEDURE — 99244 OFF/OP CNSLTJ NEW/EST MOD 40: CPT | Mod: S$GLB,,, | Performed by: OBSTETRICS & GYNECOLOGY

## 2018-10-22 NOTE — LETTER
October 27, 2018      Karma Call MD  [address] [place] [address]           [facility] - Gynecologic Oncology  [address], [address]  [facility] [address]  Phone: [phone]  Fax: [phone]          Patient: Mela Manriquez   MR Number: [id number]   YOB: 1960   Date of Visit: 10/22/2018       Dear Dr. Karma Call:    Thank you for referring Mela Manriquez to me for evaluation. Attached you will find relevant portions of my assessment and plan of care.    If you have questions, please do not hesitate to call me. I look forward to following Mela Manriquez along with you.    Sincerely,    Vivien Kruse MD    Enclosure  CC:  No Recipients    If you would like to receive this communication electronically, please contact [email or web address] or [phone] to request more information on [employer] Link access.    For providers and/or their staff who would like to refer a patient to [facility], please contact us through our one-stop-shop provider referral line, [facility], at [phone].    If you feel you have received this communication in error or would no longer like to receive these types of communications, please e-mail [email or web address]

## 2018-10-27 DIAGNOSIS — C54.1 ENDOMETRIAL CANCER: Primary | ICD-10-CM

## 2018-10-27 RX ORDER — SODIUM CHLORIDE 9 MG/ML
INJECTION, SOLUTION INTRAVENOUS CONTINUOUS
Status: CANCELLED | OUTPATIENT
Start: 2018-10-27

## 2018-10-27 RX ORDER — LIDOCAINE HYDROCHLORIDE 10 MG/ML
1 INJECTION, SOLUTION EPIDURAL; INFILTRATION; INTRACAUDAL; PERINEURAL ONCE
Status: CANCELLED | OUTPATIENT
Start: 2018-10-27 | End: 2018-10-27

## 2018-11-09 ENCOUNTER — PATIENT MESSAGE (OUTPATIENT)
Dept: SURGERY | Facility: OTHER | Age: 58
End: 2018-11-09

## 2018-11-19 ENCOUNTER — ANESTHESIA EVENT (OUTPATIENT)
Dept: SURGERY | Facility: OTHER | Age: 58
End: 2018-11-19
Payer: COMMERCIAL

## 2018-11-19 ENCOUNTER — HOSPITAL ENCOUNTER (OUTPATIENT)
Dept: PREADMISSION TESTING | Facility: OTHER | Age: 58
Discharge: HOME | End: 2018-11-19
Attending: OBSTETRICS & GYNECOLOGY
Payer: COMMERCIAL

## 2018-11-19 VITALS
WEIGHT: 179 LBS | DIASTOLIC BLOOD PRESSURE: 79 MMHG | OXYGEN SATURATION: 97 % | HEIGHT: 60 IN | BODY MASS INDEX: 35.14 KG/M2 | TEMPERATURE: 98 F | SYSTOLIC BLOOD PRESSURE: 155 MMHG | RESPIRATION RATE: 16 BRPM | HEART RATE: 80 BPM

## 2018-11-19 LAB
ABO + RH BLD: NORMAL
ANION GAP SERPL CALC-SCNC: 13 MMOL/L
BLD GP AB SCN CELLS X3 SERPL QL: NORMAL
BUN SERPL-MCNC: 24 MG/DL
CALCIUM SERPL-MCNC: 10.2 MG/DL
CHLORIDE SERPL-SCNC: 106 MMOL/L
CO2 SERPL-SCNC: 24 MMOL/L
CREAT SERPL-MCNC: 1.2 MG/DL
EST. GFR  (AFRICAN AMERICAN): 57 ML/MIN/1.73 M^2
EST. GFR  (NON AFRICAN AMERICAN): 49 ML/MIN/1.73 M^2
GLUCOSE SERPL-MCNC: 103 MG/DL
POTASSIUM SERPL-SCNC: 4.3 MMOL/L
SODIUM SERPL-SCNC: 143 MMOL/L

## 2018-11-19 PROCEDURE — 80048 BASIC METABOLIC PNL TOTAL CA: CPT

## 2018-11-19 PROCEDURE — 86850 RBC ANTIBODY SCREEN: CPT

## 2018-11-19 RX ORDER — LIDOCAINE HYDROCHLORIDE 10 MG/ML
0.5 INJECTION, SOLUTION EPIDURAL; INFILTRATION; INTRACAUDAL; PERINEURAL ONCE
Status: CANCELLED | OUTPATIENT
Start: 2018-11-19 | End: 2018-11-19

## 2018-11-19 RX ORDER — SODIUM CHLORIDE, SODIUM LACTATE, POTASSIUM CHLORIDE, CALCIUM CHLORIDE 600; 310; 30; 20 MG/100ML; MG/100ML; MG/100ML; MG/100ML
INJECTION, SOLUTION INTRAVENOUS CONTINUOUS
Status: CANCELLED | OUTPATIENT
Start: 2018-11-19

## 2018-11-19 NOTE — ANESTHESIA PREPROCEDURE EVALUATION
11/19/2018  Mela Manriquez is a 58 y.o., female.    Anesthesia Evaluation    I have reviewed the Patient Summary Reports.    I have reviewed the Nursing Notes.   I have reviewed the Medications.     Review of Systems  Anesthesia Hx:  Denies Family Hx of Anesthesia complications.   Denies Personal Hx of Anesthesia complications.   Hematology/Oncology:        Current/Recent Cancer. (eondometrial)   EENT/Dental:   chronic allergic rhinitis   Cardiovascular:   Exercise tolerance: good Hypertension hyperlipidemia    Pulmonary:  Pulmonary Normal    Neurological:  Neurology Normal    Endocrine:   Hypothyroidism        Physical Exam  General:  Well nourished, Morbid Obesity    Airway/Jaw/Neck:  Airway Findings: Mouth Opening: Normal Tongue: Normal  General Airway Assessment: Adult  Mallampati: II  TM Distance: Normal, at least 6 cm      Dental:  Dental Findings: In tact, molar caps, upper front caps        Mental Status:  Mental Status Findings:  Alert and Oriented, Cooperative         Anesthesia Plan  Type of Anesthesia, risks & benefits discussed:  Anesthesia Type:  general  Patient's Preference:   Intra-op Monitoring Plan: standard ASA monitors  Intra-op Monitoring Plan Comments:   Post Op Pain Control Plan: multimodal analgesia  Post Op Pain Control Plan Comments:   Induction:   IV  Beta Blocker:         Informed Consent: Patient understands risks and agrees with Anesthesia plan.  Questions answered. Anesthesia consent signed with patient.  ASA Score: 3     Day of Surgery Review of History & Physical:    H&P update referred to the surgeon.     Anesthesia Plan Notes: Labs in epic        Ready For Surgery From Anesthesia Perspective.

## 2018-11-19 NOTE — DISCHARGE INSTRUCTIONS
PRE-ADMIT TESTING -  [phone]    [address]  [facility]          Your surgery has been scheduled at [facility]. We are pleased to have the opportunity to serve you. For Further Information please call [phone].    On the day of surgery please report to the Information Desk on the 1st floor.    · CONTACT YOUR PHYSICIAN'S OFFICE THE DAY PRIOR TO YOUR SURGERY TO OBTAIN YOUR ARRIVAL TIME.     · The evening before surgery do not eat anything after 9 p.m. ( this includes hard candy, chewing gum and mints).  You may only have GATORADE, POWERADE AND WATER  from 9 p.m. until you leave your home.   DO NOT DRINK ANY LIQUIDS ON THE WAY TO THE HOSPITAL.      SPECIAL MEDICATION INSTRUCTIONS: TAKE medications checked off by the Anesthesiologist on your Medication List.    Angiogram Patients: Take medications as instructed by your physician, including aspirin.     Surgery Patients:    If you take ASPIRIN - Your PHYSICIAN/SURGEON will need to inform you IF/OR when you need to stop taking aspirin prior to your surgery.     Do Not take any medications containing IBUPROFEN.  Do Not Wear any make-up or dark nail polish   (especially eye make-up) to surgery. If you come to surgery with makeup on you will be required to remove the makeup or nail polish.    Do not shave your surgical area at least 5 days prior to your surgery. The surgical prep will be performed at the hospital according to Infection Control regulations.    Leave all valuables at home.   Do Not wear any jewelry or watches, including any metal in body piercings.  Contact Lens must be removed before surgery. Either do not wear the contact lens or bring a case and solution for storage.  Please bring a container for eyeglasses or dentures as required.  Bring any paperwork your physician has provided, such as consent forms,  history and physicals, doctor's orders, etc.   Bring comfortable clothes that are loose fitting to wear upon  discharge. Take into consideration the type of surgery being performed.  Maintain your diet as advised per your physician the day prior to surgery.      Adequate rest the night before surgery is advised.   Park in the Parking lot behind the hospital or in the [facility] across the street from the parking lot. Parking is complimentary.  If you will be discharged the same day as your procedure, please arrange for a responsible adult to drive you home or to accompany you if traveling by taxi.   YOU WILL NOT BE PERMITTED TO DRIVE OR TO LEAVE THE HOSPITAL ALONE AFTER SURGERY.   It is strongly recommended that you arrange for someone to remain with you for the first 24 hrs following your surgery.       Thank you for your cooperation.  The Staff of [facility].        Bathing Instructions                                                                 Please shower the evening before and morning of your procedure with    ANTIBACTERIAL SOAP. ( DIAL, etc )  Concentrate on the surgical area   for at least 3 minutes and rinse completely. Dry off as usual.   Do not use any deodorant, powder, body lotions, perfume, after shave or    cologne.

## 2018-11-23 DIAGNOSIS — E78.2 MIXED HYPERLIPIDEMIA: ICD-10-CM

## 2018-11-23 DIAGNOSIS — E03.9 HYPOTHYROIDISM, UNSPECIFIED TYPE: ICD-10-CM

## 2018-11-23 DIAGNOSIS — I10 ESSENTIAL HYPERTENSION, BENIGN: ICD-10-CM

## 2018-11-26 RX ORDER — LISINOPRIL 20 MG/1
TABLET ORAL
Qty: 90 TABLET | Refills: 3 | Status: SHIPPED | OUTPATIENT
Start: 2018-11-26 | End: 2019-10-21 | Stop reason: SDUPTHER

## 2018-11-26 RX ORDER — SIMVASTATIN 40 MG/1
TABLET, FILM COATED ORAL
Qty: 90 TABLET | Refills: 3 | Status: SHIPPED | OUTPATIENT
Start: 2018-11-26 | End: 2019-03-29

## 2018-11-26 RX ORDER — LEVOTHYROXINE SODIUM 50 UG/1
TABLET ORAL
Qty: 90 TABLET | Refills: 3 | Status: SHIPPED | OUTPATIENT
Start: 2018-11-26 | End: 2019-11-06 | Stop reason: SDUPTHER

## 2018-11-27 ENCOUNTER — PATIENT MESSAGE (OUTPATIENT)
Dept: SURGERY | Facility: OTHER | Age: 58
End: 2018-11-27

## 2018-11-29 ENCOUNTER — TELEPHONE (OUTPATIENT)
Dept: GYNECOLOGIC ONCOLOGY | Facility: CLINIC | Age: 58
End: 2018-11-29

## 2018-11-29 NOTE — TELEPHONE ENCOUNTER
----- Message from Chandrika Gonzalez sent at 11/29/2018 11:17 AM CST -----  Name of Who is Calling: Slava ()      What is the request in detail: Wants to know what time the pts surgery is tomorrow      Can the clinic reply by [facility]:    No       What Number to Call Back [facility] not [facility]: [phone]

## 2018-11-29 NOTE — TELEPHONE ENCOUNTER
Spoke with pt . He was informed pt will need to go to the [facility] location [address] [place] [address] Friday 11/30/18 for 5:30 am. He voiced understanding

## 2018-11-30 ENCOUNTER — ANESTHESIA (OUTPATIENT)
Dept: SURGERY | Facility: OTHER | Age: 58
End: 2018-11-30
Payer: COMMERCIAL

## 2018-11-30 ENCOUNTER — HOSPITAL ENCOUNTER (OUTPATIENT)
Facility: OTHER | Age: 58
Discharge: HOME | End: 2018-12-01
Attending: OBSTETRICS & GYNECOLOGY | Admitting: OBSTETRICS & GYNECOLOGY
Payer: COMMERCIAL

## 2018-11-30 DIAGNOSIS — Z90.710 HISTORY OF ROBOT-ASSISTED LAPAROSCOPIC HYSTERECTOMY: ICD-10-CM

## 2018-11-30 DIAGNOSIS — C54.1 ENDOMETRIAL CANCER: ICD-10-CM

## 2018-11-30 DIAGNOSIS — C54.1 ENDOMETRIAL CANCER, GRADE I: Primary | ICD-10-CM

## 2018-11-30 LAB — POCT GLUCOSE: 123 MG/DL (ref 70–110)

## 2018-11-30 PROCEDURE — 25000242 PHARM REV CODE 250 ALT 637 W/ HCPCS: Performed by: STUDENT IN AN ORGANIZED HEALTH CARE EDUCATION/TRAINING PROGRAM

## 2018-11-30 PROCEDURE — 94799 UNLISTED PULMONARY SVC/PX: CPT

## 2018-11-30 PROCEDURE — 82962 GLUCOSE BLOOD TEST: CPT | Performed by: OBSTETRICS & GYNECOLOGY

## 2018-11-30 PROCEDURE — 25000003 PHARM REV CODE 250: Performed by: NURSE ANESTHETIST, CERTIFIED REGISTERED

## 2018-11-30 PROCEDURE — 63600175 PHARM REV CODE 636 W HCPCS: Performed by: NURSE ANESTHETIST, CERTIFIED REGISTERED

## 2018-11-30 PROCEDURE — 58571 TLH W/T/O 250 G OR LESS: CPT | Mod: ,,, | Performed by: OBSTETRICS & GYNECOLOGY

## 2018-11-30 PROCEDURE — 63600175 PHARM REV CODE 636 W HCPCS: Performed by: ANESTHESIOLOGY

## 2018-11-30 PROCEDURE — 37000009 HC ANESTHESIA EA ADD 15 MINS: Performed by: OBSTETRICS & GYNECOLOGY

## 2018-11-30 PROCEDURE — 36000712 HC OR TIME LEV V 1ST 15 MIN: Performed by: OBSTETRICS & GYNECOLOGY

## 2018-11-30 PROCEDURE — P9045 ALBUMIN (HUMAN), 5%, 250 ML: HCPCS | Mod: JG | Performed by: NURSE ANESTHETIST, CERTIFIED REGISTERED

## 2018-11-30 PROCEDURE — 63600175 PHARM REV CODE 636 W HCPCS: Performed by: OBSTETRICS & GYNECOLOGY

## 2018-11-30 PROCEDURE — 88309 TISSUE EXAM BY PATHOLOGIST: CPT | Mod: 26,,, | Performed by: PATHOLOGY

## 2018-11-30 PROCEDURE — 38571 LAPAROSCOPY LYMPHADENECTOMY: CPT | Mod: 51,,, | Performed by: OBSTETRICS & GYNECOLOGY

## 2018-11-30 PROCEDURE — 38571 LAPAROSCOPY LYMPHADENECTOMY: CPT | Mod: AS,51,, | Performed by: NURSE PRACTITIONER

## 2018-11-30 PROCEDURE — 27201423 OPTIME MED/SURG SUP & DEVICES STERILE SUPPLY: Performed by: OBSTETRICS & GYNECOLOGY

## 2018-11-30 PROCEDURE — 71000039 HC RECOVERY, EACH ADD'L HOUR: Performed by: OBSTETRICS & GYNECOLOGY

## 2018-11-30 PROCEDURE — 25000003 PHARM REV CODE 250: Performed by: ANESTHESIOLOGY

## 2018-11-30 PROCEDURE — 88307 TISSUE EXAM BY PATHOLOGIST: CPT | Mod: 26,,, | Performed by: PATHOLOGY

## 2018-11-30 PROCEDURE — 25000003 PHARM REV CODE 250: Performed by: STUDENT IN AN ORGANIZED HEALTH CARE EDUCATION/TRAINING PROGRAM

## 2018-11-30 PROCEDURE — 36000713 HC OR TIME LEV V EA ADD 15 MIN: Performed by: OBSTETRICS & GYNECOLOGY

## 2018-11-30 PROCEDURE — 37000008 HC ANESTHESIA 1ST 15 MINUTES: Performed by: OBSTETRICS & GYNECOLOGY

## 2018-11-30 PROCEDURE — 58571 TLH W/T/O 250 G OR LESS: CPT | Mod: AS,,, | Performed by: NURSE PRACTITIONER

## 2018-11-30 PROCEDURE — 94761 N-INVAS EAR/PLS OXIMETRY MLT: CPT

## 2018-11-30 PROCEDURE — 71000033 HC RECOVERY, INTIAL HOUR: Performed by: OBSTETRICS & GYNECOLOGY

## 2018-11-30 PROCEDURE — 88307 TISSUE EXAM BY PATHOLOGIST: CPT | Performed by: PATHOLOGY

## 2018-11-30 RX ORDER — AMOXICILLIN 250 MG
1 CAPSULE ORAL 2 TIMES DAILY
Status: DISCONTINUED | OUTPATIENT
Start: 2018-11-30 | End: 2018-12-01 | Stop reason: HOSPADM

## 2018-11-30 RX ORDER — GLYCOPYRROLATE 0.2 MG/ML
INJECTION INTRAMUSCULAR; INTRAVENOUS
Status: DISCONTINUED | OUTPATIENT
Start: 2018-11-30 | End: 2018-11-30

## 2018-11-30 RX ORDER — ALBUMIN HUMAN 50 G/1000ML
SOLUTION INTRAVENOUS CONTINUOUS PRN
Status: DISCONTINUED | OUTPATIENT
Start: 2018-11-30 | End: 2018-11-30

## 2018-11-30 RX ORDER — FLUTICASONE PROPIONATE 50 MCG
1 SPRAY, SUSPENSION (ML) NASAL DAILY
Status: DISCONTINUED | OUTPATIENT
Start: 2018-11-30 | End: 2018-12-01 | Stop reason: HOSPADM

## 2018-11-30 RX ORDER — IBUPROFEN 400 MG/1
800 TABLET ORAL 3 TIMES DAILY
Status: DISCONTINUED | OUTPATIENT
Start: 2018-11-30 | End: 2018-12-01 | Stop reason: HOSPADM

## 2018-11-30 RX ORDER — PROMETHAZINE HYDROCHLORIDE 25 MG/1
25 SUPPOSITORY RECTAL EVERY 6 HOURS PRN
Status: DISCONTINUED | OUTPATIENT
Start: 2018-11-30 | End: 2018-12-01 | Stop reason: HOSPADM

## 2018-11-30 RX ORDER — LABETALOL HYDROCHLORIDE 5 MG/ML
INJECTION, SOLUTION INTRAVENOUS
Status: DISCONTINUED | OUTPATIENT
Start: 2018-11-30 | End: 2018-11-30

## 2018-11-30 RX ORDER — ONDANSETRON 8 MG/1
8 TABLET, ORALLY DISINTEGRATING ORAL EVERY 8 HOURS PRN
Status: DISCONTINUED | OUTPATIENT
Start: 2018-11-30 | End: 2018-12-01 | Stop reason: HOSPADM

## 2018-11-30 RX ORDER — CEFAZOLIN SODIUM 1 G/3ML
3 INJECTION, POWDER, FOR SOLUTION INTRAMUSCULAR; INTRAVENOUS
Status: COMPLETED | OUTPATIENT
Start: 2018-11-30 | End: 2018-11-30

## 2018-11-30 RX ORDER — SODIUM CHLORIDE, SODIUM LACTATE, POTASSIUM CHLORIDE, CALCIUM CHLORIDE 600; 310; 30; 20 MG/100ML; MG/100ML; MG/100ML; MG/100ML
INJECTION, SOLUTION INTRAVENOUS CONTINUOUS
Status: ACTIVE | OUTPATIENT
Start: 2018-11-30 | End: 2018-12-01

## 2018-11-30 RX ORDER — FLUTICASONE FUROATE AND VILANTEROL 100; 25 UG/1; UG/1
1 POWDER RESPIRATORY (INHALATION) DAILY
Status: DISCONTINUED | OUTPATIENT
Start: 2018-11-30 | End: 2018-12-01 | Stop reason: HOSPADM

## 2018-11-30 RX ORDER — OXYCODONE AND ACETAMINOPHEN 5; 325 MG/1; MG/1
1 TABLET ORAL EVERY 4 HOURS PRN
Status: DISCONTINUED | OUTPATIENT
Start: 2018-11-30 | End: 2018-12-01 | Stop reason: HOSPADM

## 2018-11-30 RX ORDER — ONDANSETRON 2 MG/ML
INJECTION INTRAMUSCULAR; INTRAVENOUS
Status: DISCONTINUED | OUTPATIENT
Start: 2018-11-30 | End: 2018-11-30

## 2018-11-30 RX ORDER — MEPERIDINE HYDROCHLORIDE 50 MG/ML
12.5 INJECTION INTRAMUSCULAR; INTRAVENOUS; SUBCUTANEOUS ONCE AS NEEDED
Status: DISCONTINUED | OUTPATIENT
Start: 2018-11-30 | End: 2018-11-30 | Stop reason: HOSPADM

## 2018-11-30 RX ORDER — ONDANSETRON 2 MG/ML
4 INJECTION INTRAMUSCULAR; INTRAVENOUS DAILY PRN
Status: DISCONTINUED | OUTPATIENT
Start: 2018-11-30 | End: 2018-11-30 | Stop reason: HOSPADM

## 2018-11-30 RX ORDER — ACETAMINOPHEN 10 MG/ML
INJECTION, SOLUTION INTRAVENOUS
Status: DISCONTINUED | OUTPATIENT
Start: 2018-11-30 | End: 2018-11-30

## 2018-11-30 RX ORDER — MUPIROCIN 20 MG/G
1 OINTMENT TOPICAL 2 TIMES DAILY
Status: DISCONTINUED | OUTPATIENT
Start: 2018-11-30 | End: 2018-12-01 | Stop reason: HOSPADM

## 2018-11-30 RX ORDER — FENTANYL CITRATE 50 UG/ML
INJECTION, SOLUTION INTRAMUSCULAR; INTRAVENOUS
Status: DISCONTINUED | OUTPATIENT
Start: 2018-11-30 | End: 2018-11-30

## 2018-11-30 RX ORDER — SIMVASTATIN 10 MG/1
40 TABLET, FILM COATED ORAL NIGHTLY
Status: DISCONTINUED | OUTPATIENT
Start: 2018-11-30 | End: 2018-12-01 | Stop reason: HOSPADM

## 2018-11-30 RX ORDER — HYDROMORPHONE HYDROCHLORIDE 1 MG/ML
1 INJECTION, SOLUTION INTRAMUSCULAR; INTRAVENOUS; SUBCUTANEOUS EVERY 4 HOURS PRN
Status: DISCONTINUED | OUTPATIENT
Start: 2018-11-30 | End: 2018-12-01 | Stop reason: HOSPADM

## 2018-11-30 RX ORDER — OXYCODONE AND ACETAMINOPHEN 10; 325 MG/1; MG/1
1 TABLET ORAL EVERY 4 HOURS PRN
Status: DISCONTINUED | OUTPATIENT
Start: 2018-11-30 | End: 2018-12-01 | Stop reason: HOSPADM

## 2018-11-30 RX ORDER — OXYCODONE AND ACETAMINOPHEN 5; 325 MG/1; MG/1
1 TABLET ORAL EVERY 4 HOURS PRN
Qty: 20 TABLET | Refills: 0 | Status: SHIPPED | OUTPATIENT
Start: 2018-11-30 | End: 2018-12-01 | Stop reason: SDUPTHER

## 2018-11-30 RX ORDER — CETIRIZINE HYDROCHLORIDE 10 MG/1
10 TABLET ORAL DAILY
Status: DISCONTINUED | OUTPATIENT
Start: 2018-11-30 | End: 2018-12-01 | Stop reason: HOSPADM

## 2018-11-30 RX ORDER — IBUPROFEN 800 MG/1
800 TABLET ORAL 3 TIMES DAILY
Qty: 30 TABLET | Refills: 3 | Status: SHIPPED | OUTPATIENT
Start: 2018-11-30 | End: 2019-04-08 | Stop reason: SDUPTHER

## 2018-11-30 RX ORDER — SUCCINYLCHOLINE CHLORIDE 20 MG/ML
INJECTION INTRAMUSCULAR; INTRAVENOUS
Status: DISCONTINUED | OUTPATIENT
Start: 2018-11-30 | End: 2018-11-30

## 2018-11-30 RX ORDER — FENTANYL CITRATE 50 UG/ML
25 INJECTION, SOLUTION INTRAMUSCULAR; INTRAVENOUS EVERY 5 MIN PRN
Status: DISCONTINUED | OUTPATIENT
Start: 2018-11-30 | End: 2018-11-30 | Stop reason: HOSPADM

## 2018-11-30 RX ORDER — OXYCODONE HYDROCHLORIDE 5 MG/1
5 TABLET ORAL
Status: DISCONTINUED | OUTPATIENT
Start: 2018-11-30 | End: 2018-11-30 | Stop reason: HOSPADM

## 2018-11-30 RX ORDER — SODIUM CHLORIDE 0.9 % (FLUSH) 0.9 %
3 SYRINGE (ML) INJECTION
Status: DISCONTINUED | OUTPATIENT
Start: 2018-11-30 | End: 2018-12-01 | Stop reason: HOSPADM

## 2018-11-30 RX ORDER — PROPOFOL 10 MG/ML
VIAL (ML) INTRAVENOUS
Status: DISCONTINUED | OUTPATIENT
Start: 2018-11-30 | End: 2018-11-30

## 2018-11-30 RX ORDER — ROCURONIUM BROMIDE 10 MG/ML
INJECTION, SOLUTION INTRAVENOUS
Status: DISCONTINUED | OUTPATIENT
Start: 2018-11-30 | End: 2018-11-30

## 2018-11-30 RX ORDER — MIDAZOLAM HYDROCHLORIDE 1 MG/ML
INJECTION INTRAMUSCULAR; INTRAVENOUS
Status: DISCONTINUED | OUTPATIENT
Start: 2018-11-30 | End: 2018-11-30

## 2018-11-30 RX ORDER — NEOSTIGMINE METHYLSULFATE 1 MG/ML
INJECTION, SOLUTION INTRAVENOUS
Status: DISCONTINUED | OUTPATIENT
Start: 2018-11-30 | End: 2018-11-30

## 2018-11-30 RX ORDER — LIDOCAINE HYDROCHLORIDE 10 MG/ML
0.5 INJECTION, SOLUTION EPIDURAL; INFILTRATION; INTRACAUDAL; PERINEURAL ONCE
Status: DISCONTINUED | OUTPATIENT
Start: 2018-11-30 | End: 2018-11-30 | Stop reason: HOSPADM

## 2018-11-30 RX ORDER — SODIUM CHLORIDE 9 MG/ML
INJECTION, SOLUTION INTRAVENOUS CONTINUOUS
Status: DISCONTINUED | OUTPATIENT
Start: 2018-11-30 | End: 2018-11-30

## 2018-11-30 RX ORDER — LEVOTHYROXINE SODIUM 25 UG/1
50 TABLET ORAL DAILY
Status: DISCONTINUED | OUTPATIENT
Start: 2018-11-30 | End: 2018-12-01 | Stop reason: HOSPADM

## 2018-11-30 RX ORDER — SODIUM CHLORIDE, SODIUM LACTATE, POTASSIUM CHLORIDE, CALCIUM CHLORIDE 600; 310; 30; 20 MG/100ML; MG/100ML; MG/100ML; MG/100ML
INJECTION, SOLUTION INTRAVENOUS CONTINUOUS
Status: DISCONTINUED | OUTPATIENT
Start: 2018-11-30 | End: 2018-11-30

## 2018-11-30 RX ORDER — DIPHENHYDRAMINE HCL 25 MG
25 CAPSULE ORAL EVERY 4 HOURS PRN
Status: DISCONTINUED | OUTPATIENT
Start: 2018-11-30 | End: 2018-12-01 | Stop reason: HOSPADM

## 2018-11-30 RX ORDER — LIDOCAINE HYDROCHLORIDE 10 MG/ML
1 INJECTION, SOLUTION EPIDURAL; INFILTRATION; INTRACAUDAL; PERINEURAL ONCE
Status: DISCONTINUED | OUTPATIENT
Start: 2018-11-30 | End: 2018-11-30 | Stop reason: HOSPADM

## 2018-11-30 RX ORDER — LIDOCAINE HCL/PF 100 MG/5ML
SYRINGE (ML) INTRAVENOUS
Status: DISCONTINUED | OUTPATIENT
Start: 2018-11-30 | End: 2018-11-30

## 2018-11-30 RX ADMIN — FLUTICASONE PROPIONATE 50 MCG: 50 SPRAY, METERED NASAL at 02:11

## 2018-11-30 RX ADMIN — IBUPROFEN 800 MG: 400 TABLET, FILM COATED ORAL at 08:11

## 2018-11-30 RX ADMIN — ROCURONIUM BROMIDE 10 MG: 10 INJECTION INTRAVENOUS at 08:11

## 2018-11-30 RX ADMIN — ROCURONIUM BROMIDE 10 MG: 10 INJECTION INTRAVENOUS at 07:11

## 2018-11-30 RX ADMIN — SODIUM CHLORIDE, SODIUM LACTATE, POTASSIUM CHLORIDE, AND CALCIUM CHLORIDE: .6; .31; .03; .02 INJECTION, SOLUTION INTRAVENOUS at 12:11

## 2018-11-30 RX ADMIN — CETIRIZINE HYDROCHLORIDE 10 MG: 10 TABLET, FILM COATED ORAL at 02:11

## 2018-11-30 RX ADMIN — PROPOFOL 50 MG: 10 INJECTION, EMULSION INTRAVENOUS at 09:11

## 2018-11-30 RX ADMIN — SIMVASTATIN 40 MG: 10 TABLET, FILM COATED ORAL at 08:11

## 2018-11-30 RX ADMIN — OXYCODONE HYDROCHLORIDE AND ACETAMINOPHEN 1 TABLET: 10; 325 TABLET ORAL at 08:11

## 2018-11-30 RX ADMIN — CEFAZOLIN 3 G: 330 INJECTION, POWDER, FOR SOLUTION INTRAMUSCULAR; INTRAVENOUS at 06:11

## 2018-11-30 RX ADMIN — SENNOSIDES AND DOCUSATE SODIUM 1 TABLET: 8.6; 5 TABLET ORAL at 08:11

## 2018-11-30 RX ADMIN — MUPIROCIN 1 G: 20 OINTMENT TOPICAL at 02:11

## 2018-11-30 RX ADMIN — LIDOCAINE HYDROCHLORIDE 100 MG: 20 INJECTION, SOLUTION INTRAVENOUS at 07:11

## 2018-11-30 RX ADMIN — FENTANYL CITRATE 25 MCG: 50 INJECTION, SOLUTION INTRAMUSCULAR; INTRAVENOUS at 10:11

## 2018-11-30 RX ADMIN — PROMETHAZINE HYDROCHLORIDE 6.25 MG: 25 INJECTION INTRAMUSCULAR; INTRAVENOUS at 11:11

## 2018-11-30 RX ADMIN — FLUTICASONE FUROATE AND VILANTEROL TRIFENATATE 1 PUFF: 100; 25 POWDER RESPIRATORY (INHALATION) at 12:11

## 2018-11-30 RX ADMIN — MIDAZOLAM HYDROCHLORIDE 2 MG: 1 INJECTION, SOLUTION INTRAMUSCULAR; INTRAVENOUS at 06:11

## 2018-11-30 RX ADMIN — SENNOSIDES AND DOCUSATE SODIUM 1 TABLET: 8.6; 5 TABLET ORAL at 02:11

## 2018-11-30 RX ADMIN — FENTANYL CITRATE 100 MCG: 50 INJECTION, SOLUTION INTRAMUSCULAR; INTRAVENOUS at 07:11

## 2018-11-30 RX ADMIN — ALBUMIN (HUMAN): 2.5 SOLUTION INTRAVENOUS at 07:11

## 2018-11-30 RX ADMIN — ACETAMINOPHEN 1000 MG: 10 INJECTION, SOLUTION INTRAVENOUS at 07:11

## 2018-11-30 RX ADMIN — NEOSTIGMINE METHYLSULFATE 5 MG: 1 INJECTION INTRAVENOUS at 09:11

## 2018-11-30 RX ADMIN — ROCURONIUM BROMIDE 20 MG: 10 INJECTION INTRAVENOUS at 07:11

## 2018-11-30 RX ADMIN — GLYCOPYRROLATE 0.2 MG: 0.2 INJECTION, SOLUTION INTRAMUSCULAR; INTRAVENOUS at 07:11

## 2018-11-30 RX ADMIN — SUCCINYLCHOLINE CHLORIDE 160 MG: 20 INJECTION, SOLUTION INTRAMUSCULAR; INTRAVENOUS at 07:11

## 2018-11-30 RX ADMIN — IBUPROFEN 800 MG: 400 TABLET, FILM COATED ORAL at 02:11

## 2018-11-30 RX ADMIN — SODIUM CHLORIDE, SODIUM LACTATE, POTASSIUM CHLORIDE, AND CALCIUM CHLORIDE: 600; 310; 30; 20 INJECTION, SOLUTION INTRAVENOUS at 06:11

## 2018-11-30 RX ADMIN — MUPIROCIN 1 G: 20 OINTMENT TOPICAL at 08:11

## 2018-11-30 RX ADMIN — SODIUM CHLORIDE, SODIUM LACTATE, POTASSIUM CHLORIDE, AND CALCIUM CHLORIDE: .6; .31; .03; .02 INJECTION, SOLUTION INTRAVENOUS at 08:11

## 2018-11-30 RX ADMIN — ONDANSETRON 4 MG: 2 INJECTION INTRAMUSCULAR; INTRAVENOUS at 07:11

## 2018-11-30 RX ADMIN — ALBUMIN (HUMAN): 2.5 SOLUTION INTRAVENOUS at 08:11

## 2018-11-30 RX ADMIN — PROPOFOL 50 MG: 10 INJECTION, EMULSION INTRAVENOUS at 08:11

## 2018-11-30 RX ADMIN — PROPOFOL 250 MG: 10 INJECTION, EMULSION INTRAVENOUS at 07:11

## 2018-11-30 RX ADMIN — GLYCOPYRROLATE 0.8 MG: 0.2 INJECTION, SOLUTION INTRAMUSCULAR; INTRAVENOUS at 09:11

## 2018-11-30 RX ADMIN — LABETALOL HYDROCHLORIDE 15 MG: 5 INJECTION, SOLUTION INTRAVENOUS at 07:11

## 2018-11-30 RX ADMIN — CARBOXYMETHYLCELLULOSE SODIUM 2 DROP: 2.5 SOLUTION/ DROPS OPHTHALMIC at 07:11

## 2018-11-30 RX ADMIN — OXYCODONE HYDROCHLORIDE 5 MG: 5 TABLET ORAL at 10:11

## 2018-11-30 RX ADMIN — ROCURONIUM BROMIDE 40 MG: 10 INJECTION INTRAVENOUS at 07:11

## 2018-11-30 NOTE — OP NOTE
DATE OF PROCEDURE:  11/30/2018     SURGEON:  Vivien Kruse M.D.     ASSISTANTS: CONCEPCIÓN Thakur, First Assist-- No qualified resident was available for the procedure. Ana Cano MD (RES)     PREOPERATIVE DIAGNOSIS:   1. Grade I endometrioid type endometrial adenocarcinoma   2. Morbid obesity     POSTOPERATIVE DIAGNOSES:    1. Grade I endometrioid type endometrial adenocarcinoma   2. Morbid obesity     PROCEDURE PERFORMED:  Robotic-assisted total laparoscopic hysterectomy,   bilateral salpingo-oophorectomy, bilateral pelvic lymph node dissection     ANESTHESIA:  General endotracheal anesthesia.     SPECIMENS REMOVED:  1.  Uterus and cervix.  2.  Bilateral fallopian tubes and ovaries.  3.  Bilateral pelvic lymph nodes.     ESTIMATED BLOOD LOSS:  25 mL.     COMPLICATIONS:  None.     FINDINGS: 12 week size uterus. Normal fallopian tubes and ovaries bilaterally. 1-2cm grossly enlarged lymph nodes on the left.  No obvious intraperitoneal disease. No ascites.      PROCEDURE IN DETAIL:  The patient was taken to the Operating Room.  Informed consent had been obtained.  She underwent general endotracheal anesthesia without difficulty, was prepped and draped in the normal sterile fashion in a dorsal lithotomy position.  Timeout was performed.  All parties agreed to the planned procedure.  Perioperative antibiotics were administered.  Lu catheter was placed under sterile conditions.  The VCare uterine manipulator was secured in place in a standard fashion for uterine manipulation.  Gloves were changed and attention was turned to the patient's abdomen.       The umbilicus was inverted. Veress needle was gently inserted.  Intra-abdominal placement was confirmed with low CO2 pressure and water drop test.  Abdomen was insufflated and pneumoperitoneum was obtained.  Skin incision was made superior to the umbilicus.  Robotic trocar was introduced.  Intra-abdominal placement was confirmed.  Additional trocars were  placed, 2 robotic trocars to the left of the camera, 1 robotic trocar to the right of the camera and an additional 8 mm assist port to the right of the camera.  The patient was placed in steep Trendelenburg. Robot was docked and operating surgeon reported to the console.       Survey of the abdomen and pelvis revealed the above findings. Bilateral round ligaments were identified.  These were cauterized and transected.  The posterior leaf of the broad ligament was then opened bilaterally facilitating access to the retroperitoneum.  The infundibulopelvic ligaments were then skeletonized with good visualization of the ureter beneath.  These were cauterized and transected.  The anterior leaf of the broad ligament was then opened circumferentially.  Proper plane for the bladder flap was identified and the bladder was gently reflected off of the anterior aspect of the uterus and cervix to the level of the cervicovaginal junction. Bilateral uterine arteries were then skeletonized.  These were cauterized and transected.  The remainder of the uterosacral and cardinal ligaments were then also serially cauterized and transected.  Posterior colpotomy was initiated in the 6 o'clock position and carried around circumferentially.  The uterus, cervix, bilateral fallopian tubes and ovaries were then removed through the vagina.       We then proceeded with lymphadenectomy.  Pararectal and paravesical spaces were opened  bilaterally.  All fibrofatty tissue was removed within defined boundaries for the pelvic lymphadenectomy including laterally the psoas muscle and the genitofemoral nerve, medially the superior vesicle artery and the ureter, cephalad the midpoint of the bifurcation of the common iliac arteries, caudad deep circumflex iliac vein and the deep boundary bilaterally the obturator nerve.  Para-aortic lymph node dissection was unable to safely be performed due to morbid obesity. The lymph nodes were removed through the  vagina.       We then closed the vaginal cuff with a V-Loc running suture in a running fashion. Prosper was placed along lyn basins and in the pelvis. The robotic trocars were then removed under direct visualization and the robot was undocked. Skin incisions were then rendered hemostatic.  These were closed with 4-0 Monocryl in a subcuticular fashion and topped with sterile Dermabond.  The patient tolerated the procedure well. Sponge, lap, needle and instrument counts were correct x2 as reported by the circulating nurse.  She was awakened from anesthesia and taken to recovery in stable condition.

## 2018-11-30 NOTE — BRIEF OP NOTE
[facility]  Brief Operative Note    SUMMARY     Surgery Date: 11/30/2018     Surgeon(s) and Role:     * Vivien Kruse MD - Primary     * Ana Cano MD - Resident - Assisting    Pre-op Diagnosis:  Endometrial cancer [C54.1]    Post-op Diagnosis:  Post-Op Diagnosis Codes:     * Endometrial cancer [C54.1]    Procedure(s) (LRB):  XI ROBOTIC HYSTERECTOMY (N/A)  XI ROBOTIC SALPINGO-OOPHORECTOMY (Bilateral)  XI ROBOTIC LYMPHADENECTOMY, PELVIC (Bilateral)    Anesthesia: General    Description of the findings of the procedure:   1. Normal female external genitalia, mild vaginal atrophy  2. Uterine size and adnexa difficult to palpate on bimanual exam  3. Normal bilateral ovaries, fallopian tubes, normal uterine outline  4. Uterus opened on back table to show large area of obvious tumor    Estimated Blood Loss: 30 mL    IVF: 1500cc    UOP: 200cc    Complications: none         Specimens:   Specimen (12h ago, onward)    Start     Ordered    11/30/18 0909  Specimen to Pathology - Surgery  Once     Comments:  1. Uterus, cervix, bilateral tubes and ovaries2. Right obturator/pelvic Lymph Nodes3. Left obturator/Pelvic Lymph Nodes     Start Status   11/30/18 0909 Collected (11/30/18 0946)       11/30/18 0946          Aan Cano M.D.  PGY-4 ObGyn  [phone]

## 2018-11-30 NOTE — OPERATIVE NOTE ADDENDUM
Certification of Assistant at Surgery       Surgery Date: 11/30/2018     Participating Surgeons:  Surgeon(s) and Role:     * Vivien Kruse MD - Primary     * Ana Cano MD - Resident - Assisting    Procedures:  Procedure(s) (LRB):  XI ROBOTIC HYSTERECTOMY (N/A)  XI ROBOTIC SALPINGO-OOPHORECTOMY (Bilateral)  XI ROBOTIC LYMPHADENECTOMY, PELVIC (Bilateral)    Assistant Surgeon's Certification of Necessity:  I understand that section 1842 (b) (6) (d) of the Social Security Act generally prohibits Medicare Part B reasonable charge payment for the services of assistants at surgery in teaching hospitals when qualified residents are available to furnish such services. I certify that the services for which payment is claimed were medically necessary, and that no qualified resident was available to perform the services. I further understand that these services are subject to post-payment review by the Medicare carrier.      Eufemia Ching NP    11/30/2018  10:09 AM

## 2018-11-30 NOTE — H&P
No changes since the prior H and P written, copied below. Patient understands indication, risks, alternatives of surgery. All questions answered.    Sebastien Mir MD  OB/GYN PGY-3  Pager: [phone]      HPI      Ms. Manriquez is a 58yr old para 2 (vag del x 2) referred from Dr. Call for Gr 1 endometrial cancer found on EMBx. Patient reports LMP at age 48, started bleeding on 10/1/2018 sometimes heavy with clots.     Pelvic US   Uterus 11 x 6 x 6cm with NATALIE 3.3cm  Bilateral ovaries not visualized     EMBx sound to 6cm, Endometrial adenocarcinoma, endometrioid type, FIGO grade 1.     Last pap smear 10/3/2018 Atypical glandular cells, favor a neoplastic process. No prior history for abnormal pap smears.     Surgical history: open appendectomy and cholecystectomy, 1976     Family history negative for breast, ovarian or colon CA. Niece - uterine vs cervical CA (30s), another niece - bone CA (24)     Review of Systems   Constitutional: Negative for appetite change (decreased w stress of diagnosis), chills, diaphoresis, fatigue, fever and unexpected weight change.   HENT: Negative for mouth sores and tinnitus.    Respiratory: Positive for chest tightness (chronic bronchitis). Negative for cough, shortness of breath and wheezing.    Cardiovascular: Negative for chest pain, palpitations and leg swelling.   Gastrointestinal: Negative for abdominal distention, abdominal pain, blood in stool, constipation, diarrhea, nausea and vomiting.   Genitourinary: Positive for vaginal bleeding (since 10/1). Negative for difficulty urinating, dysuria, flank pain, frequency, hematuria, pelvic pain, vaginal discharge and vaginal pain.   Musculoskeletal: Negative for arthralgias and back pain.   Skin: Negative for color change and rash.   Neurological: Negative for dizziness, weakness, numbness and headaches.   Hematological: Negative for adenopathy.   Psychiatric/Behavioral: Negative for confusion and sleep disturbance. The patient is  nervous/anxious.               Past Medical History:   Diagnosis Date    AR (allergic rhinitis)      Benign essential hypertension      Endometrial cancer, grade I 10/22/2018    Fibromyalgia      High cholesterol      Hypothyroidism              Past Surgical History:   Procedure Laterality Date    APPENDECTOMY   1976     open w cholecystectomy    CHOLECYSTECTOMY   1976     open    COLONOSCOPY   2013    SINUS SURGERY   1995    TONSILLECTOMY                Family History   Problem Relation Age of Onset    Diabetes Mother      Diabetes Brother      Diabetes Paternal Grandfather        Social History               Socioeconomic History    Marital status:        Spouse name: Not on file    Number of children: Not on file    Years of education: Not on file    Highest education level: Not on file   Social Needs    Financial resource strain: Not on file    Food insecurity - worry: Not on file    Food insecurity - inability: Not on file    Transportation needs - medical: Not on file    Transportation needs - non-medical: Not on file   Occupational History    Not on file   Tobacco Use    Smoking status: Never Smoker    Smokeless tobacco: Never Used   Substance and Sexual Activity    Alcohol use: No    Drug use: No    Sexual activity: Yes       Partners: Male   Other Topics Concern    Not on file   Social History Narrative    Not on file              Current Outpatient Medications   Medication Sig    ADVAIR DISKUS 100-50 mcg/dose diskus inhaler      cetirizine (ZYRTEC) 10 MG tablet Take 10 mg by mouth once daily.    fish oil-omega-3 fatty acids 300-1,000 mg capsule Take 1 capsule by mouth once daily.    fluticasone (FLONASE) 50 mcg/actuation nasal spray USE 1 SPRAY NASALLY DAILY    gabapentin (NEURONTIN) 300 MG capsule Take 1 capsule (300 mg total) by mouth 3 (three) times daily.    ibuprofen (ADVIL,MOTRIN) 800 MG tablet Take 1 tablet (800 mg total) by mouth 3 (three) times daily.     levothyroxine (SYNTHROID) 50 MCG tablet Take 1 tablet (50 mcg total) by mouth once daily.    lisinopril (PRINIVIL,ZESTRIL) 20 MG tablet Take 1 tablet (20 mg total) by mouth once daily.    MULTIVIT-IRON-MIN-FOLIC ACID 3,500-18-0.4 UNIT-MG-MG ORAL CHEW Take by mouth.    simvastatin (ZOCOR) 40 MG tablet Take 1 tablet (40 mg total) by mouth every evening.      No current facility-administered medications for this visit.    /63   Pulse 74   Ht 5' (1.524 m)   Wt (!) 136.9 kg (301 lb 13 oz)   LMP  (LMP Unknown)   BMI 58.94 kg/m²           Review of patient's allergies indicates:   Allergen Reactions    Cymbalta [duloxetine] Nausea Only    Azithromycin Diarrhea    Ceclor [cefaclor] Rash         Objective:   Physical Exam:   Constitutional: She is oriented to person, place, and time. She appears well-developed and well-nourished. No distress.    HENT:   Head: Normocephalic and atraumatic.    Eyes: No scleral icterus.    Neck: Normal range of motion.    Cardiovascular: Exam reveals no cyanosis and no edema.     Pulmonary/Chest: Effort normal. No respiratory distress. She exhibits no tenderness.         Abdominal: Soft. Normal appearance. She exhibits no distension, no fluid wave, no ascites and no mass. There is no tenderness. There is no rigidity, no rebound and no guarding. No hernia.         Genitourinary: Uterus normal. Pelvic exam was performed with patient supine. There is no rash, tenderness or lesion on the right labia. There is no rash, tenderness or lesion on the left labia. Uterus is not tender. Cervix is normal. Right adnexum displays no mass, no tenderness and no fullness. Left adnexum displays no mass, no tenderness and no fullness. There is bleeding (scant mucus and blood) in the vagina. No vaginal discharge found.   Genitourinary Comments: Exam limited by habitus           Musculoskeletal: Normal range of motion and moves all extremeties. She exhibits no edema.      Lymphadenopathy:         Right: No inguinal adenopathy present.        Left: No inguinal adenopathy present.    Neurological: She is alert and oriented to person, place, and time.    Skin: Skin is warm and dry. No rash noted. No cyanosis or erythema. No pallor.    Psychiatric: She has a normal mood and affect. Thought content normal.         Assessment:      1. Morbid obesity    2. Endometrial cancer, grade I          Plan:       - EMBx pathology and today's exam reviewed with patient and   - recommend RALH BSO possible staging pending intraoperative findings/ability to access lymph nodes with comordities and habitus  - The risks, benefits, and indications for surgery were discussed with the patient. These included bleeding, infection, damage to surrounding tissues, the possibility of bowel or urologic resection and reconstruction, and the possibility of major complications including death. She voiced understanding, all questions were answered and consents were signed.        Subjective:      Patient ID: Mela Manriquez is a 58 y.o. female.     Chief Complaint: Malignant neoplasm of uterus (Consult)        HPI      Ms. Manriquez is a 58yr old para 2 (vag del x 2) referred from Dr. Call for Gr 1 endometrial cancer found on EMBx. Patient reports LMP at age 48, started bleeding on 10/1/2018 sometimes heavy with clots.     Pelvic US   Uterus 11 x 6 x 6cm with NATALIE 3.3cm  Bilateral ovaries not visualized     EMBx sound to 6cm, Endometrial adenocarcinoma, endometrioid type, FIGO grade 1.     Last pap smear 10/3/2018 Atypical glandular cells, favor a neoplastic process. No prior history for abnormal pap smears.     Surgical history: open appendectomy and cholecystectomy, 1976     Family history negative for breast, ovarian or colon CA. Niece - uterine vs cervical CA (30s), another niece - bone CA (24)     Review of Systems   Constitutional: Negative for appetite change (decreased w stress of diagnosis), chills, diaphoresis, fatigue,  fever and unexpected weight change.   HENT: Negative for mouth sores and tinnitus.    Respiratory: Positive for chest tightness (chronic bronchitis). Negative for cough, shortness of breath and wheezing.    Cardiovascular: Negative for chest pain, palpitations and leg swelling.   Gastrointestinal: Negative for abdominal distention, abdominal pain, blood in stool, constipation, diarrhea, nausea and vomiting.   Genitourinary: Positive for vaginal bleeding (since 10/1). Negative for difficulty urinating, dysuria, flank pain, frequency, hematuria, pelvic pain, vaginal discharge and vaginal pain.   Musculoskeletal: Negative for arthralgias and back pain.   Skin: Negative for color change and rash.   Neurological: Negative for dizziness, weakness, numbness and headaches.   Hematological: Negative for adenopathy.   Psychiatric/Behavioral: Negative for confusion and sleep disturbance. The patient is nervous/anxious.               Past Medical History:   Diagnosis Date    AR (allergic rhinitis)      Benign essential hypertension      Endometrial cancer, grade I 10/22/2018    Fibromyalgia      High cholesterol      Hypothyroidism              Past Surgical History:   Procedure Laterality Date    APPENDECTOMY   1976     open w cholecystectomy    CHOLECYSTECTOMY   1976     open    COLONOSCOPY   2013    SINUS SURGERY   1995    TONSILLECTOMY                Family History   Problem Relation Age of Onset    Diabetes Mother      Diabetes Brother      Diabetes Paternal Grandfather        Social History               Socioeconomic History    Marital status:        Spouse name: Not on file    Number of children: Not on file    Years of education: Not on file    Highest education level: Not on file   Social Needs    Financial resource strain: Not on file    Food insecurity - worry: Not on file    Food insecurity - inability: Not on file    Transportation needs - medical: Not on file    Transportation needs  - non-medical: Not on file   Occupational History    Not on file   Tobacco Use    Smoking status: Never Smoker    Smokeless tobacco: Never Used   Substance and Sexual Activity    Alcohol use: No    Drug use: No    Sexual activity: Yes       Partners: Male   Other Topics Concern    Not on file   Social History Narrative    Not on file              Current Outpatient Medications   Medication Sig    ADVAIR DISKUS 100-50 mcg/dose diskus inhaler      cetirizine (ZYRTEC) 10 MG tablet Take 10 mg by mouth once daily.    fish oil-omega-3 fatty acids 300-1,000 mg capsule Take 1 capsule by mouth once daily.    fluticasone (FLONASE) 50 mcg/actuation nasal spray USE 1 SPRAY NASALLY DAILY    gabapentin (NEURONTIN) 300 MG capsule Take 1 capsule (300 mg total) by mouth 3 (three) times daily.    ibuprofen (ADVIL,MOTRIN) 800 MG tablet Take 1 tablet (800 mg total) by mouth 3 (three) times daily.    levothyroxine (SYNTHROID) 50 MCG tablet Take 1 tablet (50 mcg total) by mouth once daily.    lisinopril (PRINIVIL,ZESTRIL) 20 MG tablet Take 1 tablet (20 mg total) by mouth once daily.    MULTIVIT-IRON-MIN-FOLIC ACID 3,500-18-0.4 UNIT-MG-MG ORAL CHEW Take by mouth.    simvastatin (ZOCOR) 40 MG tablet Take 1 tablet (40 mg total) by mouth every evening.      No current facility-administered medications for this visit.    /63   Pulse 74   Ht 5' (1.524 m)   Wt (!) 136.9 kg (301 lb 13 oz)   LMP  (LMP Unknown)   BMI 58.94 kg/m²           Review of patient's allergies indicates:   Allergen Reactions    Cymbalta [duloxetine] Nausea Only    Azithromycin Diarrhea    Ceclor [cefaclor] Rash         Objective:   Physical Exam:   Constitutional: She is oriented to person, place, and time. She appears well-developed and well-nourished. No distress.    HENT:   Head: Normocephalic and atraumatic.    Eyes: No scleral icterus.    Neck: Normal range of motion.    Cardiovascular: Exam reveals no cyanosis and no edema.      Pulmonary/Chest: Effort normal. No respiratory distress. She exhibits no tenderness.         Abdominal: Soft. Normal appearance. She exhibits no distension, no fluid wave, no ascites and no mass. There is no tenderness. There is no rigidity, no rebound and no guarding. No hernia.         Genitourinary: Uterus normal. Pelvic exam was performed with patient supine. There is no rash, tenderness or lesion on the right labia. There is no rash, tenderness or lesion on the left labia. Uterus is not tender. Cervix is normal. Right adnexum displays no mass, no tenderness and no fullness. Left adnexum displays no mass, no tenderness and no fullness. There is bleeding (scant mucus and blood) in the vagina. No vaginal discharge found.   Genitourinary Comments: Exam limited by habitus           Musculoskeletal: Normal range of motion and moves all extremeties. She exhibits no edema.      Lymphadenopathy:        Right: No inguinal adenopathy present.        Left: No inguinal adenopathy present.    Neurological: She is alert and oriented to person, place, and time.    Skin: Skin is warm and dry. No rash noted. No cyanosis or erythema. No pallor.    Psychiatric: She has a normal mood and affect. Thought content normal.         Assessment:      1. Morbid obesity    2. Endometrial cancer, grade I          Plan:       - EMBx pathology and today's exam reviewed with patient and   - recommend RALH BSO possible staging pending intraoperative findings/ability to access lymph nodes with comordities and habitus  - The risks, benefits, and indications for surgery were discussed with the patient. These included bleeding, infection, damage to surrounding tissues, the possibility of bowel or urologic resection and reconstruction, and the possibility of major complications including death. She voiced understanding, all questions were answered and consents were signed.

## 2018-11-30 NOTE — CARE UPDATE
Received patient to room 370. Pt AAO x 4. Resp even and unlabored. Lap sites x 5 with dermabond CDI. SCD's in place bilateral. Accepting clear liquids well. Diet advanced to regular. VSS. Oriented to room. F/C patent and draining clear yellow urine to g/u bag. Family @ bedside. Safety maintained. Bed in lowest position and locked. Call light in reach.

## 2018-11-30 NOTE — TRANSFER OF CARE
Anesthesia Transfer of Care Note    Patient: Mela Manriquez    Procedure(s) Performed: Procedure(s) (LRB):  XI ROBOTIC HYSTERECTOMY (N/A)  XI ROBOTIC SALPINGO-OOPHORECTOMY (Bilateral)  XI ROBOTIC LYMPHADENECTOMY, PELVIC (Bilateral)    Patient location: PACU    Anesthesia Type: general    Transport from OR: Transported from OR on 2-3 L/min O2 by NC with adequate spontaneous ventilation    Post pain: adequate analgesia    Post assessment: no apparent anesthetic complications    Post vital signs: stable    Level of consciousness: awake, alert and oriented    Nausea/Vomiting: no nausea/vomiting    Transfer of care protocol was followed      Last vitals:   Visit Vitals  /79 (BP Location: Left arm, Patient Position: Lying)   Pulse 71   Temp 36.4 °C (97.5 °F) (Oral)   Resp 16   Ht 5' (1.524 m)   Wt 122.7 kg (270 lb 8.1 oz)   LMP  (LMP Unknown)   SpO2 97%   Breastfeeding? No   BMI 52.83 kg/m²

## 2018-11-30 NOTE — PLAN OF CARE
Problem: Patient Care Overview  Goal: Plan of Care Review  Outcome: Ongoing (interventions implemented as appropriate)  Plan of care reviewed with patient. Pt free from falls or injury. Purposeful rounding done. Pt denies any c/o discomfort. Tolerated diet well. VSS. Family @ bedside. Safety maintained. Bed in lowest position and locked. Call light in reach.

## 2018-11-30 NOTE — ANESTHESIA POSTPROCEDURE EVALUATION
Anesthesia Post Evaluation    Patient: Mela Manriquez    Procedure(s) Performed: Procedure(s) (LRB):  XI ROBOTIC HYSTERECTOMY (N/A)  XI ROBOTIC SALPINGO-OOPHORECTOMY (Bilateral)  XI ROBOTIC LYMPHADENECTOMY, PELVIC (Bilateral)    Final Anesthesia Type: general  Patient location during evaluation: PACU  Patient participation: Yes- Able to Participate  Level of consciousness: awake and alert  Post-procedure vital signs: reviewed and stable  Pain management: adequate  Airway patency: patent  PONV status at discharge: No PONV  Anesthetic complications: no      Cardiovascular status: blood pressure returned to baseline  Respiratory status: unassisted, spontaneous ventilation and room air  Hydration status: euvolemic  Follow-up not needed.        Visit Vitals  /67   Pulse (!) 57   Temp 36.7 °C (98.1 °F)   Resp 20   Ht 5' (1.524 m)   Wt 122.7 kg (270 lb 8.1 oz)   LMP  (LMP Unknown)   SpO2 95%   Breastfeeding? No   BMI 52.83 kg/m²       Pain/Davion Score: Pain Assessment Performed: Yes (11/30/2018 10:10 AM)  Presence of Pain: complains of pain/discomfort (11/30/2018 11:35 AM)  Pain Rating Prior to Med Admin: 7 (11/30/2018 10:56 AM)  Davion Score: 9 (11/30/2018 11:35 AM)

## 2018-12-01 VITALS
SYSTOLIC BLOOD PRESSURE: 98 MMHG | WEIGHT: 293 LBS | HEIGHT: 60 IN | BODY MASS INDEX: 57.52 KG/M2 | DIASTOLIC BLOOD PRESSURE: 47 MMHG | RESPIRATION RATE: 18 BRPM | OXYGEN SATURATION: 95 % | TEMPERATURE: 98 F | HEART RATE: 60 BPM

## 2018-12-01 PROBLEM — C54.1 ENDOMETRIAL CANCER: Status: RESOLVED | Noted: 2018-11-30 | Resolved: 2018-12-01

## 2018-12-01 LAB
BASOPHILS # BLD AUTO: 0 K/UL
BASOPHILS NFR BLD: 0 %
DIFFERENTIAL METHOD: ABNORMAL
EOSINOPHIL # BLD AUTO: 0 K/UL
EOSINOPHIL NFR BLD: 0 %
ERYTHROCYTE [DISTWIDTH] IN BLOOD BY AUTOMATED COUNT: 13.8 %
HCT VFR BLD AUTO: 32.8 %
HGB BLD-MCNC: 10.7 G/DL
LYMPHOCYTES # BLD AUTO: 1.4 K/UL
LYMPHOCYTES NFR BLD: 11.5 %
MCH RBC QN AUTO: 29.2 PG
MCHC RBC AUTO-ENTMCNC: 32.6 G/DL
MCV RBC AUTO: 89 FL
MONOCYTES # BLD AUTO: 0.6 K/UL
MONOCYTES NFR BLD: 4.7 %
NEUTROPHILS # BLD AUTO: 10.5 K/UL
NEUTROPHILS NFR BLD: 83.6 %
PLATELET # BLD AUTO: 261 K/UL
PMV BLD AUTO: 10.9 FL
RBC # BLD AUTO: 3.67 M/UL
WBC # BLD AUTO: 12.53 K/UL

## 2018-12-01 PROCEDURE — 25000003 PHARM REV CODE 250: Performed by: STUDENT IN AN ORGANIZED HEALTH CARE EDUCATION/TRAINING PROGRAM

## 2018-12-01 PROCEDURE — 85025 COMPLETE CBC W/AUTO DIFF WBC: CPT

## 2018-12-01 PROCEDURE — 36415 COLL VENOUS BLD VENIPUNCTURE: CPT

## 2018-12-01 PROCEDURE — 25000242 PHARM REV CODE 250 ALT 637 W/ HCPCS: Performed by: STUDENT IN AN ORGANIZED HEALTH CARE EDUCATION/TRAINING PROGRAM

## 2018-12-01 RX ORDER — OXYCODONE AND ACETAMINOPHEN 5; 325 MG/1; MG/1
1 TABLET ORAL EVERY 4 HOURS PRN
Qty: 20 TABLET | Refills: 0 | Status: SHIPPED | OUTPATIENT
Start: 2018-12-01 | End: 2018-12-31

## 2018-12-01 RX ADMIN — SENNOSIDES AND DOCUSATE SODIUM 1 TABLET: 8.6; 5 TABLET ORAL at 08:12

## 2018-12-01 RX ADMIN — FLUTICASONE PROPIONATE 50 MCG: 50 SPRAY, METERED NASAL at 08:12

## 2018-12-01 RX ADMIN — FLUTICASONE FUROATE AND VILANTEROL TRIFENATATE 1 PUFF: 100; 25 POWDER RESPIRATORY (INHALATION) at 09:12

## 2018-12-01 RX ADMIN — OXYCODONE HYDROCHLORIDE AND ACETAMINOPHEN 1 TABLET: 10; 325 TABLET ORAL at 03:12

## 2018-12-01 RX ADMIN — CETIRIZINE HYDROCHLORIDE 10 MG: 10 TABLET, FILM COATED ORAL at 08:12

## 2018-12-01 RX ADMIN — LEVOTHYROXINE SODIUM 50 MCG: 25 TABLET ORAL at 05:12

## 2018-12-01 RX ADMIN — IBUPROFEN 800 MG: 400 TABLET, FILM COATED ORAL at 08:12

## 2018-12-01 RX ADMIN — MUPIROCIN 1 G: 20 OINTMENT TOPICAL at 08:12

## 2018-12-01 NOTE — DISCHARGE SUMMARY
[facility]  Obstetrics & Gynecology  Discharge Summary    Patient Name: Mela Manriquez  MRN: [id number]  Admission Date: 11/30/2018  Hospital Length of Stay: 0 days  Discharge Date and Time:  12/01/2018 7:35 AM  Attending Physician: Vivien Kruse MD   Discharging Provider: Sebastien Mir MD  Primary Care Provider: Shahriar Desai MD    HPI: Admitted for scheduled RATLH/BSO/staging. Patient pain controlled, tolerating PO. Johnston removed this AM. Has motivation to ambulate. Has not voided without johnston yet.       Hospital Course: meeting all post op milestones    Procedure(s) (LRB):  XI ROBOTIC HYSTERECTOMY (N/A)  XI ROBOTIC SALPINGO-OOPHORECTOMY (Bilateral)  XI ROBOTIC LYMPHADENECTOMY, PELVIC (Bilateral)     Consults:     Significant Diagnostic Studies: Labs: All labs within the past 24 hours have been reviewed    Pending Diagnostic Studies:     None        Final Active Diagnoses:    Diagnosis Date Noted POA    History of robot-assisted laparoscopic hysterectomy/BSO/pelvic LND [Z90.710] 11/30/2018 Not Applicable    Essential hypertension, benign [I10] 01/05/2016 Yes    Mixed hyperlipidemia [E78.2] 01/05/2016 Yes    Hypothyroidism [E03.9] 01/05/2016 Yes      Problems Resolved During this Admission:    Diagnosis Date Noted Date Resolved POA    PRINCIPAL PROBLEM:  Endometrial cancer [C54.1] 11/30/2018 12/01/2018 Yes        Discharged Condition: good    Disposition: Home or Self Care    Follow Up:  Follow-up Information     Vivien Kruse MD In 6 weeks.    Specialty:  Gynecologic Oncology  Why:  Post Op Visit  Contact information:  [address] [place] [id number]  [phone]                 Patient Instructions:      Other restrictions (specify):   Order Comments: No driving until pain free and off of pain meds  Nothing in vagina until cleared by gynecologist (no tampons, douching, sex)  No baths for two weeks, only showers  No lifting anything more than 10 pounds for two weeks      Medications:  Reconciled Home Medications:      Medication List      START taking these medications    oxyCODONE-acetaminophen 5-325 mg per tablet  Commonly known as:  PERCOCET  Take 1 tablet by mouth every 4 (four) hours as needed.        CONTINUE taking these medications    ADVAIR DISKUS 100-50 mcg/dose diskus inhaler  Generic drug:  fluticasone-salmeterol 100-50 mcg/dose     cetirizine 10 MG tablet  Commonly known as:  ZYRTEC  Take 10 mg by mouth once daily.     fish oil-omega-3 fatty acids 300-1,000 mg capsule  Take 1 capsule by mouth once daily.     fluticasone 50 mcg/actuation nasal spray  Commonly known as:  FLONASE  USE 1 SPRAY NASALLY DAILY     ibuprofen 800 MG tablet  Commonly known as:  ADVIL,MOTRIN  Take 1 tablet (800 mg total) by mouth 3 (three) times daily.     levothyroxine 50 MCG tablet  Commonly known as:  SYNTHROID  TAKE 1 TABLET DAILY     lisinopril 20 MG tablet  Commonly known as:  PRINIVIL,ZESTRIL  TAKE 1 TABLET DAILY     multivit-iron-min-folic acid 3,500-18-0.4 unit-mg-mg Chew  Take by mouth.     simvastatin 40 MG tablet  Commonly known as:  ZOCOR  TAKE 1 TABLET EVERY EVENING            Sebastien Mir MD  Obstetrics & Gynecology  [facility]

## 2018-12-01 NOTE — PROGRESS NOTES
Gyn Onc PROGRESS NOTE     Mela Manriquez is a 58 y.o. female POD #1 s/p RATLH/BSO/staging. Patient pain controlled, tolerating PO. Johnston removed this AM. Has motivation to ambulate. Has not voided without johnston yet.     Objective:       Temp:  [96.7 °F (35.9 °C)-98.9 °F (37.2 °C)] 98 °F (36.7 °C)  Pulse:  [54-71] 54  Resp:  [16-20] 18  SpO2:  [94 %-99 %] 95 %  BP: ()/(47-81) 98/47    General:   alert, appears stated age and cooperative   Lungs:   clear to auscultation bilaterally   Heart:   regular rate and rhythm   Abdomen:  soft, non-tender; bowel sounds normal; no masses,  no organomegaly           Incision: Trocar sties c/d/i   Extremities: no pedal edema noted     Lab Review  Recent Results (from the past 4 hour(s))   CBC auto differential    Collection Time: 12/01/18  4:41 AM   Result Value Ref Range    WBC 12.53 3.90 - 12.70 K/uL    RBC 3.67 (L) 4.00 - 5.40 M/uL    Hemoglobin 10.7 (L) 12.0 - 16.0 g/dL    Hematocrit 32.8 (L) 37.0 - 48.5 %    MCV 89 82 - 98 fL    MCH 29.2 27.0 - 31.0 pg    MCHC 32.6 32.0 - 36.0 g/dL    RDW 13.8 11.5 - 14.5 %    Platelets 261 150 - 350 K/uL    MPV 10.9 9.2 - 12.9 fL    Gran # (ANC) 10.5 (H) 1.8 - 7.7 K/uL    Lymph # 1.4 1.0 - 4.8 K/uL    Mono # 0.6 0.3 - 1.0 K/uL    Eos # 0.0 0.0 - 0.5 K/uL    Baso # 0.00 0.00 - 0.20 K/uL    Gran% 83.6 (H) 38.0 - 73.0 %    Lymph% 11.5 (L) 18.0 - 48.0 %    Mono% 4.7 4.0 - 15.0 %    Eosinophil% 0.0 0.0 - 8.0 %    Basophil% 0.0 0.0 - 1.9 %    Differential Method Automated        I/O    Intake/Output Summary (Last 24 hours) at 12/1/2018 0729  Last data filed at 12/1/2018 0000  Gross per 24 hour   Intake 2240 ml   Output 1480 ml   Net 760 ml        Assessment:     Patient Active Problem List   Diagnosis    Essential hypertension, benign    Mixed hyperlipidemia    Nephrolithiasis    Hypothyroidism    Morbid obesity    Allergic rhinitis    Fibromyalgia    Endometrial cancer, grade I    History of robot-assisted laparoscopic  hysterectomy/BSO/pelvic LND        Plan:   1. Postpartum care:  - Patient doing well. Continue routine management and advances.  - Continue PO pain meds. Pain well controlled.  - Heme: H/h 10.7/32  - Encourage ambulation  - Passive voiding trial        Dispo: As patient meets milestones, will plan to discharge today.    Sebastien Mir

## 2018-12-03 DIAGNOSIS — J06.9 UPPER RESPIRATORY TRACT INFECTION, UNSPECIFIED TYPE: ICD-10-CM

## 2018-12-03 RX ORDER — CEPHALEXIN 250 MG/1
CAPSULE ORAL
Qty: 3 EACH | Refills: 3 | Status: SHIPPED | OUTPATIENT
Start: 2018-12-03 | End: 2020-08-31

## 2018-12-13 ENCOUNTER — TELEPHONE (OUTPATIENT)
Dept: GYNECOLOGIC ONCOLOGY | Facility: CLINIC | Age: 58
End: 2018-12-13

## 2018-12-13 NOTE — TELEPHONE ENCOUNTER
Called and spoke with patient. She is recovering well from surgery she states. Also reviewed pathology.     Stage IA, grade 2, endometrioid type endometrial adenocarcinoma, 17% myometrial invasion, negative LVSI, negative lymph nodes. Low risk by GOG 99 criteria. Will review +/- adjuvant vaginal cuff radiation.     She voiced understanding. Post op scheduled with me for 12/31. Encouraged to call if she has concerns.

## 2018-12-28 ENCOUNTER — TELEPHONE (OUTPATIENT)
Dept: GYNECOLOGIC ONCOLOGY | Facility: CLINIC | Age: 58
End: 2018-12-28

## 2018-12-31 ENCOUNTER — OFFICE VISIT (OUTPATIENT)
Dept: GYNECOLOGIC ONCOLOGY | Facility: CLINIC | Age: 58
End: 2018-12-31
Payer: COMMERCIAL

## 2018-12-31 VITALS
DIASTOLIC BLOOD PRESSURE: 62 MMHG | HEIGHT: 60 IN | HEART RATE: 75 BPM | SYSTOLIC BLOOD PRESSURE: 144 MMHG | BODY MASS INDEX: 57.52 KG/M2 | WEIGHT: 293 LBS

## 2018-12-31 DIAGNOSIS — Z90.710 HISTORY OF ROBOT-ASSISTED LAPAROSCOPIC HYSTERECTOMY: ICD-10-CM

## 2018-12-31 DIAGNOSIS — C54.1 ENDOMETRIAL CANCER: Primary | ICD-10-CM

## 2018-12-31 PROCEDURE — 99024 POSTOP FOLLOW-UP VISIT: CPT | Mod: S$GLB,,, | Performed by: OBSTETRICS & GYNECOLOGY

## 2018-12-31 PROCEDURE — 99024 PR POST-OP FOLLOW-UP VISIT: ICD-10-PCS | Mod: S$GLB,,, | Performed by: OBSTETRICS & GYNECOLOGY

## 2018-12-31 PROCEDURE — 99999 PR PBB SHADOW E&M-EST. PATIENT-LVL III: CPT | Mod: PBBFAC,,, | Performed by: OBSTETRICS & GYNECOLOGY

## 2018-12-31 PROCEDURE — 99999 PR PBB SHADOW E&M-EST. PATIENT-LVL III: ICD-10-PCS | Mod: PBBFAC,,, | Performed by: OBSTETRICS & GYNECOLOGY

## 2019-01-01 NOTE — PROGRESS NOTES
Subjective:      Patient ID: Mela Manriquez is a 58 y.o. female.    Chief Complaint: Post-op Evaluation (4 week)      HPI  S/p RTLH/BSO/PLND 11/30/18. Uncomplicated post op course.   Final pathology Stage IA, grade 2 endometrioid type endometrial adenocarcinoma, negative lymph nodes. Low risk, no adjuvant treatment.     Presents today for post operative visit. Without complaints. Recovering well from surgery. Up and about, eating, +BM.     History:  Ms. Manriquez is a 58yr old para 2 (vag del x 2) referred from Dr. Call for Gr 1 endometrial cancer found on EMBx. Patient reports LMP at age 48, started bleeding on 10/1/2018 sometimes heavy with clots.     Pelvic US   Uterus 11 x 6 x 6cm with NATALIE 3.3cm  Bilateral ovaries not visualized     EMBx sound to 6cm, Endometrial adenocarcinoma, endometrioid type, FIGO grade 1.     Last pap smear 10/3/2018 Atypical glandular cells, favor a neoplastic process. No prior history for abnormal pap smears.     Surgical history: open appendectomy and cholecystectomy, 1976     Family history negative for breast, ovarian or colon CA. Niece - uterine vs cervical CA (30s), another niece - bone CA (24)  Review of Systems   Constitutional: Negative for appetite change, chills, fatigue and fever.   HENT: Negative for mouth sores.    Respiratory: Negative for cough and shortness of breath.    Cardiovascular: Negative for leg swelling.   Gastrointestinal: Negative for abdominal pain, blood in stool, constipation and diarrhea.   Endocrine: Negative for cold intolerance.   Genitourinary: Negative for dysuria and vaginal bleeding.   Musculoskeletal: Negative for myalgias.   Skin: Negative for rash.   Allergic/Immunologic: Negative.    Neurological: Negative for weakness and numbness.   Hematological: Negative for adenopathy. Does not bruise/bleed easily.   Psychiatric/Behavioral: Negative for confusion.       Objective:   Physical Exam:   Constitutional: She is oriented to person, place, and time.  She appears well-developed and well-nourished.    HENT:   Head: Normocephalic and atraumatic.    Eyes: EOM are normal. Pupils are equal, round, and reactive to light.    Neck: Normal range of motion. Neck supple. No thyromegaly present.    Cardiovascular: Normal rate, regular rhythm and intact distal pulses.     Pulmonary/Chest: Effort normal and breath sounds normal. No respiratory distress. She has no wheezes.        Abdominal: Soft. Bowel sounds are normal. She exhibits abdominal incision. She exhibits no distension, no ascites and no mass. There is no tenderness.             Musculoskeletal: Normal range of motion and moves all extremeties.      Lymphadenopathy:     She has no cervical adenopathy.        Right: No supraclavicular adenopathy present.        Left: No supraclavicular adenopathy present.    Neurological: She is alert and oriented to person, place, and time.    Skin: Skin is warm and dry. No rash noted.    Psychiatric: She has a normal mood and affect.       Assessment:     1. Endometrial cancer    2. History of robot-assisted laparoscopic hysterectomy/BSO/pelvic LND        Plan:   No orders of the defined types were placed in this encounter.    Recovering appropriately from surgery.   Low risk endometrial cancer as above. No adjuvant treatment.   RTC 4 weeks for follow up post operative visit and then plan for surveillance.

## 2019-01-25 ENCOUNTER — TELEPHONE (OUTPATIENT)
Dept: GYNECOLOGIC ONCOLOGY | Facility: CLINIC | Age: 59
End: 2019-01-25

## 2019-01-28 ENCOUNTER — OFFICE VISIT (OUTPATIENT)
Dept: GYNECOLOGIC ONCOLOGY | Facility: CLINIC | Age: 59
End: 2019-01-28
Payer: COMMERCIAL

## 2019-01-28 VITALS
HEART RATE: 71 BPM | HEIGHT: 60 IN | DIASTOLIC BLOOD PRESSURE: 62 MMHG | BODY MASS INDEX: 57.52 KG/M2 | WEIGHT: 293 LBS | SYSTOLIC BLOOD PRESSURE: 128 MMHG

## 2019-01-28 DIAGNOSIS — C54.1 ENDOMETRIAL CANCER: Primary | ICD-10-CM

## 2019-01-28 DIAGNOSIS — Z90.710 HISTORY OF ROBOT-ASSISTED LAPAROSCOPIC HYSTERECTOMY: ICD-10-CM

## 2019-01-28 PROCEDURE — 99999 PR PBB SHADOW E&M-EST. PATIENT-LVL III: CPT | Mod: PBBFAC,,, | Performed by: OBSTETRICS & GYNECOLOGY

## 2019-01-28 PROCEDURE — 99024 POSTOP FOLLOW-UP VISIT: CPT | Mod: S$GLB,,, | Performed by: OBSTETRICS & GYNECOLOGY

## 2019-01-28 PROCEDURE — 99999 PR PBB SHADOW E&M-EST. PATIENT-LVL III: ICD-10-PCS | Mod: PBBFAC,,, | Performed by: OBSTETRICS & GYNECOLOGY

## 2019-01-28 PROCEDURE — 99024 PR POST-OP FOLLOW-UP VISIT: ICD-10-PCS | Mod: S$GLB,,, | Performed by: OBSTETRICS & GYNECOLOGY

## 2019-01-29 NOTE — PROGRESS NOTES
Subjective:      Patient ID: Mela Manriquez is a 58 y.o. female.    Chief Complaint: Post-op Evaluation      HPI  S/p RTLH/BSO/PLND 11/30/18. Uncomplicated post op course.   Final pathology Stage IA, grade 2 endometrioid type endometrial adenocarcinoma, negative lymph nodes. Low risk, no adjuvant treatment.      Presents today for post operative visit. Continues to recover well at home she reports.      History:  Ms. Manriquez is a 58yr old para 2 (vag del x 2) referred from Dr. Call for Gr 1 endometrial cancer found on EMBx. Patient reports LMP at age 48, started bleeding on 10/1/2018 sometimes heavy with clots.     Pelvic US   Uterus 11 x 6 x 6cm with NATALIE 3.3cm  Bilateral ovaries not visualized     EMBx sound to 6cm, Endometrial adenocarcinoma, endometrioid type, FIGO grade 1.     Last pap smear 10/3/2018 Atypical glandular cells, favor a neoplastic process. No prior history for abnormal pap smears.     Surgical history: open appendectomy and cholecystectomy, 1976     Family history negative for breast, ovarian or colon CA. Niece - uterine vs cervical CA (30s), another niece - bone CA (24)  Review of Systems   Constitutional: Negative for appetite change, chills, fatigue and fever.   HENT: Negative for mouth sores.    Respiratory: Negative for cough and shortness of breath.    Cardiovascular: Negative for leg swelling.   Gastrointestinal: Negative for abdominal pain, blood in stool, constipation and diarrhea.   Endocrine: Negative for cold intolerance.   Genitourinary: Negative for dysuria and vaginal bleeding.   Musculoskeletal: Negative for myalgias.   Skin: Negative for rash.   Allergic/Immunologic: Negative.    Neurological: Negative for weakness and numbness.   Hematological: Negative for adenopathy. Does not bruise/bleed easily.   Psychiatric/Behavioral: Negative for confusion.       Objective:   Physical Exam:   Constitutional: She is oriented to person, place, and time. She appears well-developed and  well-nourished.    HENT:   Head: Normocephalic and atraumatic.    Eyes: EOM are normal. Pupils are equal, round, and reactive to light.    Neck: Normal range of motion. Neck supple. No thyromegaly present.    Cardiovascular: Normal rate, regular rhythm and intact distal pulses.     Pulmonary/Chest: Effort normal and breath sounds normal. No respiratory distress. She has no wheezes.        Abdominal: Soft. Bowel sounds are normal. She exhibits no distension, no ascites and no mass. There is no tenderness.     Genitourinary: Rectum normal and vagina normal. Pelvic exam was performed with patient supine. There is no lesion on the right labia. There is no lesion on the left labia. Uterus is absent. There is an absent adnexa. Right adnexum displays no mass. Left adnexum displays no mass. Vaginal cuff normal.Cervix exhibits absence.   Genitourinary Comments: Vaginal cuff healing well.            Musculoskeletal: Normal range of motion and moves all extremeties.      Lymphadenopathy:     She has no cervical adenopathy.        Right: No inguinal and no supraclavicular adenopathy present.        Left: No inguinal and no supraclavicular adenopathy present.    Neurological: She is alert and oriented to person, place, and time.    Skin: Skin is warm and dry. No rash noted.    Psychiatric: She has a normal mood and affect.       Assessment:     1. Endometrial cancer    2. History of robot-assisted laparoscopic hysterectomy/BSO/pelvic LND        Plan:   No orders of the defined types were placed in this encounter.    Recovering appropriately from surgery.   Low risk endometrial cancer as above. No adjuvant treatment.   RTC 3 months for surveillance or sooner if needed.
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
## 2019-03-29 ENCOUNTER — OFFICE VISIT (OUTPATIENT)
Dept: INTERNAL MEDICINE | Facility: CLINIC | Age: 59
End: 2019-03-29
Payer: COMMERCIAL

## 2019-03-29 VITALS
OXYGEN SATURATION: 96 % | HEIGHT: 60 IN | SYSTOLIC BLOOD PRESSURE: 110 MMHG | DIASTOLIC BLOOD PRESSURE: 82 MMHG | BODY MASS INDEX: 57.52 KG/M2 | HEART RATE: 83 BPM | WEIGHT: 293 LBS

## 2019-03-29 DIAGNOSIS — E11.59 HYPERTENSION ASSOCIATED WITH DIABETES: ICD-10-CM

## 2019-03-29 DIAGNOSIS — E03.9 HYPOTHYROIDISM, UNSPECIFIED TYPE: ICD-10-CM

## 2019-03-29 DIAGNOSIS — E11.9 TYPE 2 DIABETES MELLITUS WITHOUT COMPLICATION, WITHOUT LONG-TERM CURRENT USE OF INSULIN: ICD-10-CM

## 2019-03-29 DIAGNOSIS — I15.2 OBESITY, DIABETES, AND HYPERTENSION SYNDROME: ICD-10-CM

## 2019-03-29 DIAGNOSIS — I10 ESSENTIAL HYPERTENSION, BENIGN: ICD-10-CM

## 2019-03-29 DIAGNOSIS — E11.59 OBESITY, DIABETES, AND HYPERTENSION SYNDROME: ICD-10-CM

## 2019-03-29 DIAGNOSIS — E78.2 MIXED HYPERLIPIDEMIA: ICD-10-CM

## 2019-03-29 DIAGNOSIS — E11.69 OBESITY, DIABETES, AND HYPERTENSION SYNDROME: ICD-10-CM

## 2019-03-29 DIAGNOSIS — J30.1 SEASONAL ALLERGIC RHINITIS DUE TO POLLEN: ICD-10-CM

## 2019-03-29 DIAGNOSIS — Z00.00 ROUTINE GENERAL MEDICAL EXAMINATION AT A HEALTH CARE FACILITY: Primary | ICD-10-CM

## 2019-03-29 DIAGNOSIS — I15.2 HYPERTENSION ASSOCIATED WITH DIABETES: ICD-10-CM

## 2019-03-29 DIAGNOSIS — E66.01 MORBID OBESITY: ICD-10-CM

## 2019-03-29 DIAGNOSIS — E66.9 OBESITY, DIABETES, AND HYPERTENSION SYNDROME: ICD-10-CM

## 2019-03-29 PROCEDURE — 90732 PPSV23 VACC 2 YRS+ SUBQ/IM: CPT | Mod: S$GLB,,, | Performed by: INTERNAL MEDICINE

## 2019-03-29 PROCEDURE — 90471 IMMUNIZATION ADMIN: CPT | Mod: S$GLB,,, | Performed by: INTERNAL MEDICINE

## 2019-03-29 PROCEDURE — 99396 PREV VISIT EST AGE 40-64: CPT | Mod: 25,S$GLB,, | Performed by: INTERNAL MEDICINE

## 2019-03-29 PROCEDURE — 90732 PNEUMOCOCCAL POLYSACCHARIDE VACCINE 23-VALENT =>2YO SQ IM: ICD-10-PCS | Mod: S$GLB,,, | Performed by: INTERNAL MEDICINE

## 2019-03-29 PROCEDURE — 99999 PR PBB SHADOW E&M-EST. PATIENT-LVL IV: ICD-10-PCS | Mod: PBBFAC,,, | Performed by: INTERNAL MEDICINE

## 2019-03-29 PROCEDURE — 90471 PNEUMOCOCCAL POLYSACCHARIDE VACCINE 23-VALENT =>2YO SQ IM: ICD-10-PCS | Mod: S$GLB,,, | Performed by: INTERNAL MEDICINE

## 2019-03-29 PROCEDURE — 99999 PR PBB SHADOW E&M-EST. PATIENT-LVL IV: CPT | Mod: PBBFAC,,, | Performed by: INTERNAL MEDICINE

## 2019-03-29 PROCEDURE — 99396 PR PREVENTIVE VISIT,EST,40-64: ICD-10-PCS | Mod: 25,S$GLB,, | Performed by: INTERNAL MEDICINE

## 2019-03-29 RX ORDER — ATORVASTATIN CALCIUM 40 MG/1
40 TABLET, FILM COATED ORAL DAILY
Qty: 90 TABLET | Refills: 4 | Status: SHIPPED | OUTPATIENT
Start: 2019-03-29 | End: 2019-10-16

## 2019-03-29 NOTE — PROGRESS NOTES
Subjective:       Patient ID: Mela Manriquez is a 58 y.o. female.    Chief Complaint: Follow-up    HPI  Wellness check.  Chart reviewed.  Regaining her strenght since her MARGARITO in Oct.  No CP, SOB.  No paresthesias.  Allergies flaring.   [employer]Kit was given to patient on 3/29/2019 12:02 PM       Review of Systems   Constitutional: Negative for activity change and unexpected weight change.   HENT: Negative for hearing loss, rhinorrhea and trouble swallowing.    Eyes: Negative for discharge and visual disturbance.   Respiratory: Negative for chest tightness and wheezing.    Cardiovascular: Negative for chest pain and palpitations.   Gastrointestinal: Negative for blood in stool, constipation, diarrhea and vomiting.   Endocrine: Negative for polydipsia and polyuria.   Genitourinary: Negative for difficulty urinating and hematuria.   Musculoskeletal: Negative for arthralgias and neck pain.   Neurological: Negative for weakness and headaches.   Psychiatric/Behavioral: Negative for confusion and dysphoric mood.   All other systems reviewed and are negative.      Objective:      Physical Exam   Constitutional: She appears well-developed. No distress.   obese   HENT:   Head: Normocephalic.   Eyes: EOM are normal.   Neck: Normal range of motion. No tracheal deviation present.   Cardiovascular: Normal rate, regular rhythm, normal heart sounds and intact distal pulses.   Pulmonary/Chest: Effort normal and breath sounds normal. No respiratory distress.   Abdominal: Soft. Bowel sounds are normal. She exhibits no distension. There is no tenderness.   Musculoskeletal: Normal range of motion. She exhibits no edema.   Neurological: She is alert. No cranial nerve deficit. She exhibits normal muscle tone. Coordination normal.   Skin: Skin is warm and dry. No rash noted. She is not diaphoretic. No erythema.   Psychiatric: She has a normal mood and affect. Her behavior is normal.   Vitals reviewed.      Assessment:       1. Routine general  medical examination at a health care facility    2. Essential hypertension, benign    3. Hypothyroidism, unspecified type    4. Mixed hyperlipidemia    5. Type 2 diabetes mellitus without complication, without long-term current use of insulin    6. Morbid obesity    7. Seasonal allergic rhinitis due to pollen    8. Hypertension associated with diabetes    9. Obesity, diabetes, and hypertension syndrome        Plan:       Mela was seen today for follow-up.    Diagnoses and all orders for this visit:    Routine general medical examination at a health care facility  -     Fecal Immunochemical Test (iFOBT); Future    Essential hypertension, benign   Well-cont    Hypothyroidism, unspecified type  -     Cancel: TSH; Future  -     TSH; Future    Mixed hyperlipidemia  -     Cancel: Lipid panel; Future  -     atorvastatin (LIPITOR) 40 MG tablet; Take 1 tablet (40 mg total) by mouth once daily.  -     Lipid panel; Future    Type 2 diabetes mellitus without complication, without long-term current use of insulin  -     Pneumococcal Polysaccharide Vaccine (23 Valent) (SQ/IM)  -     Cancel: Hemoglobin A1c; Future  -     Hemoglobin A1c; Future   Resume metformin    Morbid obesity   Monitor    Seasonal allergic rhinitis due to pollen   Cont rx    Hypertension associated with diabetes   Monitor    Obesity, diabetes, and hypertension syndrome   monitor    Follow up in about 6 months (around 9/29/2019).

## 2019-04-08 ENCOUNTER — PATIENT MESSAGE (OUTPATIENT)
Dept: INTERNAL MEDICINE | Facility: CLINIC | Age: 59
End: 2019-04-08

## 2019-04-08 DIAGNOSIS — J30.9 ALLERGIC RHINITIS: ICD-10-CM

## 2019-04-09 RX ORDER — IBUPROFEN 800 MG/1
800 TABLET ORAL 3 TIMES DAILY
Qty: 30 TABLET | Refills: 4 | Status: SHIPPED | OUTPATIENT
Start: 2019-04-09 | End: 2019-06-29 | Stop reason: SDUPTHER

## 2019-04-09 RX ORDER — FLUTICASONE PROPIONATE 50 MCG
SPRAY, SUSPENSION (ML) NASAL
Qty: 48 G | Refills: 4 | Status: SHIPPED | OUTPATIENT
Start: 2019-04-09 | End: 2020-03-25

## 2019-04-09 NOTE — TELEPHONE ENCOUNTER
Hi,  I need refills on ibuprofen and Flonase and a new script on metformin what I have is old.  All from [facility] . I hate computers !! Thanks

## 2019-04-15 ENCOUNTER — LAB VISIT (OUTPATIENT)
Dept: LAB | Facility: HOSPITAL | Age: 59
End: 2019-04-15
Attending: INTERNAL MEDICINE
Payer: COMMERCIAL

## 2019-04-15 DIAGNOSIS — Z00.00 ROUTINE GENERAL MEDICAL EXAMINATION AT A HEALTH CARE FACILITY: ICD-10-CM

## 2019-04-15 LAB — HEMOCCULT STL QL IA: NEGATIVE

## 2019-04-15 PROCEDURE — 82274 ASSAY TEST FOR BLOOD FECAL: CPT

## 2019-04-16 RX ORDER — METFORMIN HYDROCHLORIDE 500 MG/1
500 TABLET ORAL 2 TIMES DAILY WITH MEALS
COMMUNITY
End: 2019-04-16 | Stop reason: SDUPTHER

## 2019-04-16 RX ORDER — METFORMIN HYDROCHLORIDE 500 MG/1
500 TABLET ORAL 2 TIMES DAILY WITH MEALS
Qty: 180 TABLET | Refills: 4 | Status: SHIPPED | OUTPATIENT
Start: 2019-04-16 | End: 2021-08-16

## 2019-04-16 RX ORDER — GABAPENTIN 300 MG/1
CAPSULE ORAL
COMMUNITY
Start: 2019-04-08 | End: 2019-06-26 | Stop reason: SDUPTHER

## 2019-04-26 ENCOUNTER — TELEPHONE (OUTPATIENT)
Dept: GYNECOLOGIC ONCOLOGY | Facility: CLINIC | Age: 59
End: 2019-04-26

## 2019-04-29 ENCOUNTER — OFFICE VISIT (OUTPATIENT)
Dept: GYNECOLOGIC ONCOLOGY | Facility: CLINIC | Age: 59
End: 2019-04-29
Payer: COMMERCIAL

## 2019-04-29 VITALS
HEART RATE: 70 BPM | DIASTOLIC BLOOD PRESSURE: 75 MMHG | BODY MASS INDEX: 60.15 KG/M2 | SYSTOLIC BLOOD PRESSURE: 168 MMHG | WEIGHT: 293 LBS

## 2019-04-29 DIAGNOSIS — C54.1 ENDOMETRIAL CANCER: Primary | ICD-10-CM

## 2019-04-29 PROCEDURE — 99999 PR PBB SHADOW E&M-EST. PATIENT-LVL III: ICD-10-PCS | Mod: PBBFAC,,, | Performed by: OBSTETRICS & GYNECOLOGY

## 2019-04-29 PROCEDURE — 99214 PR OFFICE/OUTPT VISIT, EST, LEVL IV, 30-39 MIN: ICD-10-PCS | Mod: S$GLB,,, | Performed by: OBSTETRICS & GYNECOLOGY

## 2019-04-29 PROCEDURE — 99999 PR PBB SHADOW E&M-EST. PATIENT-LVL III: CPT | Mod: PBBFAC,,, | Performed by: OBSTETRICS & GYNECOLOGY

## 2019-04-29 PROCEDURE — 99214 OFFICE O/P EST MOD 30 MIN: CPT | Mod: S$GLB,,, | Performed by: OBSTETRICS & GYNECOLOGY

## 2019-04-29 NOTE — PROGRESS NOTES
Subjective:       Patient ID: Mela Manriquez is a 59 y.o. female.    Chief Complaint: Endometrial Cancer and Follow-up (3 mth )    HPI      Presents today for routine surveillance visit for Stage IA, grade 2 endometrioid type endometrial adenocarcinoma. Doing well with no GYN complaints.  Disease free interval 5 months    MMG 10/3/2018 per PCP  Colonoscopy: 7 yrs ago per pt. Cologuard a few weeks ago, normal.     History:  Ms. Manriquez is a 58yr old para 2 (vag del x 2) referred from Dr. Call for Gr 1 endometrial cancer found on EMBx. Patient reports LMP at age 48, started bleeding on 10/1/2018 sometimes heavy with clots.     Pelvic US   Uterus 11 x 6 x 6cm with NATALIE 3.3cm  Bilateral ovaries not visualized     EMBx sound to 6cm, Endometrial adenocarcinoma, endometrioid type, FIGO grade 1.     Last pap smear 10/3/2018 Atypical glandular cells, favor a neoplastic process. No prior history for abnormal pap smears.     Surgical history: open appendectomy and cholecystectomy, 1976     Family history negative for breast, ovarian or colon CA. Niece - uterine vs cervical CA (30s), another niece - bone CA (24)    S/p RTLH/BSO/PLND 11/30/18. Uncomplicated post op course.   Final pathology Stage IA, grade 2 endometrioid type endometrial adenocarcinoma, negative lymph nodes. Low risk, no adjuvant treatment.       Review of Systems   Constitutional: Negative for appetite change, chills, diaphoresis, fatigue, fever and unexpected weight change.   HENT: Negative for mouth sores and tinnitus.    Respiratory: Negative for cough, chest tightness, shortness of breath and wheezing.    Cardiovascular: Negative for chest pain, palpitations and leg swelling.   Gastrointestinal: Negative for abdominal distention, abdominal pain, blood in stool, constipation, diarrhea, nausea (w Metformin) and vomiting.   Genitourinary: Negative for difficulty urinating, dyspareunia, dysuria, flank pain, frequency, hematuria, pelvic pain, vaginal bleeding,  vaginal discharge and vaginal pain.   Musculoskeletal: Negative for arthralgias and back pain.   Skin: Negative for color change and rash.   Neurological: Negative for dizziness, weakness, numbness and headaches.   Hematological: Negative for adenopathy.   Psychiatric/Behavioral: Negative for confusion and sleep disturbance. The patient is not nervous/anxious.        Objective:      Physical Exam   Constitutional: She is oriented to person, place, and time. She appears well-developed and well-nourished. No distress.   HENT:   Head: Normocephalic and atraumatic.   Eyes: No scleral icterus.   Neck: Normal range of motion.   Pulmonary/Chest: Effort normal. No respiratory distress. She exhibits no tenderness. No breast tenderness.   Abdominal: Soft. She exhibits no distension and no mass. There is no tenderness. There is no rebound and no guarding.   Genitourinary: Vagina normal. Pelvic exam was performed with patient supine. There is no rash, tenderness or lesion on the right labia. There is no rash, tenderness or lesion on the left labia. Right adnexum displays no mass, no tenderness and no fullness. Left adnexum displays no mass, no tenderness and no fullness. No bleeding in the vagina. No vaginal discharge found.   Genitourinary Comments: Uterus, cervix absent. Vaginal cuff intact.   Musculoskeletal: Normal range of motion. She exhibits no edema or tenderness.   Lymphadenopathy:        Right: No inguinal adenopathy present.        Left: No inguinal adenopathy present.   Neurological: She is alert and oriented to person, place, and time.   Skin: Skin is warm and dry. No rash noted. She is not diaphoretic. No erythema. No pallor.   Psychiatric: She has a normal mood and affect. Her behavior is normal.   Nursing note and vitals reviewed.      Assessment:       1. Endometrial cancer        Plan:       MACARENA on today's exam  Feels well no new symptoms  Disease free interval 5 months  Recommend routine surveillance  RTC in  3 months or sooner for problems

## 2019-06-26 RX ORDER — GABAPENTIN 300 MG/1
CAPSULE ORAL
Qty: 90 CAPSULE | Refills: 3 | Status: SHIPPED | OUTPATIENT
Start: 2019-06-26 | End: 2019-10-24 | Stop reason: SDUPTHER

## 2019-07-01 RX ORDER — IBUPROFEN 800 MG/1
TABLET ORAL
Qty: 30 TABLET | Refills: 0 | Status: SHIPPED | OUTPATIENT
Start: 2019-07-01 | End: 2019-07-18 | Stop reason: SDUPTHER

## 2019-07-18 RX ORDER — IBUPROFEN 800 MG/1
TABLET ORAL
Qty: 30 TABLET | Refills: 0 | Status: SHIPPED | OUTPATIENT
Start: 2019-07-18 | End: 2019-08-13 | Stop reason: SDUPTHER

## 2019-08-14 RX ORDER — IBUPROFEN 800 MG/1
800 TABLET ORAL 3 TIMES DAILY
Qty: 90 TABLET | Refills: 4 | Status: SHIPPED | OUTPATIENT
Start: 2019-08-14 | End: 2021-02-19 | Stop reason: SDUPTHER

## 2019-08-16 ENCOUNTER — TELEPHONE (OUTPATIENT)
Dept: ADMINISTRATIVE | Facility: OTHER | Age: 59
End: 2019-08-16

## 2019-09-06 ENCOUNTER — TELEPHONE (OUTPATIENT)
Dept: ADMINISTRATIVE | Facility: OTHER | Age: 59
End: 2019-09-06

## 2019-09-06 DIAGNOSIS — E11.9 TYPE 2 DIABETES MELLITUS WITHOUT COMPLICATION, UNSPECIFIED WHETHER LONG TERM INSULIN USE: ICD-10-CM

## 2019-09-09 ENCOUNTER — OFFICE VISIT (OUTPATIENT)
Dept: GYNECOLOGIC ONCOLOGY | Facility: CLINIC | Age: 59
End: 2019-09-09
Payer: COMMERCIAL

## 2019-09-09 VITALS
HEIGHT: 60 IN | WEIGHT: 293 LBS | HEART RATE: 71 BPM | BODY MASS INDEX: 57.52 KG/M2 | DIASTOLIC BLOOD PRESSURE: 73 MMHG | SYSTOLIC BLOOD PRESSURE: 144 MMHG

## 2019-09-09 DIAGNOSIS — Z85.42 ENCOUNTER FOR FOLLOW-UP SURVEILLANCE OF ENDOMETRIAL CANCER: ICD-10-CM

## 2019-09-09 DIAGNOSIS — Z08 ENCOUNTER FOR FOLLOW-UP SURVEILLANCE OF ENDOMETRIAL CANCER: ICD-10-CM

## 2019-09-09 DIAGNOSIS — C54.1 ENDOMETRIAL CANCER: Primary | ICD-10-CM

## 2019-09-09 PROCEDURE — 99214 OFFICE O/P EST MOD 30 MIN: CPT | Mod: S$GLB,,, | Performed by: OBSTETRICS & GYNECOLOGY

## 2019-09-09 PROCEDURE — 99999 PR PBB SHADOW E&M-EST. PATIENT-LVL III: ICD-10-PCS | Mod: PBBFAC,,, | Performed by: OBSTETRICS & GYNECOLOGY

## 2019-09-09 PROCEDURE — 99214 PR OFFICE/OUTPT VISIT, EST, LEVL IV, 30-39 MIN: ICD-10-PCS | Mod: S$GLB,,, | Performed by: OBSTETRICS & GYNECOLOGY

## 2019-09-09 PROCEDURE — 99999 PR PBB SHADOW E&M-EST. PATIENT-LVL III: CPT | Mod: PBBFAC,,, | Performed by: OBSTETRICS & GYNECOLOGY

## 2019-09-10 PROBLEM — Z08 ENCOUNTER FOR FOLLOW-UP SURVEILLANCE OF ENDOMETRIAL CANCER: Status: ACTIVE | Noted: 2019-09-10

## 2019-09-10 PROBLEM — Z85.42 ENCOUNTER FOR FOLLOW-UP SURVEILLANCE OF ENDOMETRIAL CANCER: Status: ACTIVE | Noted: 2019-09-10

## 2019-09-11 NOTE — PROGRESS NOTES
Subjective:      Patient ID: Mela Manriquez is a 59 y.o. female.    Chief Complaint: Endometrial Cancer      HPI  Presents today for routine surveillance visit for Stage IA, grade 2 endometrioid type endometrial adenocarcinoma. Doing well with no GYN complaints. Specifically denies N/V, pain, swelling, bleeding, unexpected weight change, SOB, problems with bowel or bladder function.   Disease free interval 8 months     MMG 10/3/2018 per PCP  Colonoscopy: 7 yrs ago per pt. Cologuard a few weeks ago, normal.     History:  Ms. Manriquez is a 58yr old para 2 (vag del x 2) referred from Dr. Call for Gr 1 endometrial cancer found on EMBx. Patient reports LMP at age 48, started bleeding on 10/1/2018 sometimes heavy with clots.     Pelvic US   Uterus 11 x 6 x 6cm with NATALIE 3.3cm  Bilateral ovaries not visualized     EMBx sound to 6cm, Endometrial adenocarcinoma, endometrioid type, FIGO grade 1.     Last pap smear 10/3/2018 Atypical glandular cells, favor a neoplastic process. No prior history for abnormal pap smears.     Surgical history: open appendectomy and cholecystectomy, 1976     Family history negative for breast, ovarian or colon CA. Niece - uterine vs cervical CA (30s), another niece - bone CA (24)     S/p RTLH/BSO/PLND 11/30/18. Uncomplicated post op course.   Final pathology Stage IA, grade 2 endometrioid type endometrial adenocarcinoma, negative lymph nodes. Low risk, no adjuvant treatment.   Review of Systems   Constitutional: Negative for appetite change, chills, fatigue and fever.   HENT: Negative for mouth sores.    Respiratory: Negative for cough and shortness of breath.    Cardiovascular: Negative for leg swelling.   Gastrointestinal: Negative for abdominal pain, blood in stool, constipation and diarrhea.   Endocrine: Negative for cold intolerance.   Genitourinary: Negative for dysuria and vaginal bleeding.   Musculoskeletal: Negative for myalgias.   Skin: Negative for rash.   Allergic/Immunologic:  Negative.    Neurological: Negative for weakness and numbness.   Hematological: Negative for adenopathy. Does not bruise/bleed easily.   Psychiatric/Behavioral: Negative for confusion.       Objective:   Physical Exam:   Constitutional: She is oriented to person, place, and time. She appears well-developed and well-nourished.    HENT:   Head: Normocephalic and atraumatic.    Eyes: Pupils are equal, round, and reactive to light. EOM are normal.    Neck: Normal range of motion. Neck supple. No thyromegaly present.    Cardiovascular: Normal rate, regular rhythm and intact distal pulses.     Pulmonary/Chest: Effort normal and breath sounds normal. No respiratory distress. She has no wheezes.        Abdominal: Soft. Bowel sounds are normal. She exhibits no distension, no ascites and no mass. There is no tenderness.     Genitourinary: Rectum normal and vagina normal. Pelvic exam was performed with patient supine. There is no lesion on the right labia. There is no lesion on the left labia. Uterus is absent. There is an absent adnexa. Right adnexum displays no mass. Left adnexum displays no mass. Vaginal cuff normal.Cervix exhibits absence.           Musculoskeletal: Normal range of motion and moves all extremeties.      Lymphadenopathy:     She has no cervical adenopathy.        Right: No inguinal and no supraclavicular adenopathy present.        Left: No inguinal and no supraclavicular adenopathy present.    Neurological: She is alert and oriented to person, place, and time.    Skin: Skin is warm and dry. No rash noted.    Psychiatric: She has a normal mood and affect.       Assessment:     1. Endometrial cancer    2. Encounter for follow-up surveillance of endometrial cancer        Plan:   No orders of the defined types were placed in this encounter.    No evidence of disease on today's exam  Feels well no new symptoms  Disease free interval 8 months  Recommend continued surveillance  RTC in 3 months or sooner if  needed

## 2019-09-16 ENCOUNTER — PATIENT OUTREACH (OUTPATIENT)
Dept: ADMINISTRATIVE | Facility: HOSPITAL | Age: 59
End: 2019-09-16

## 2019-09-23 ENCOUNTER — PATIENT MESSAGE (OUTPATIENT)
Dept: INTERNAL MEDICINE | Facility: CLINIC | Age: 59
End: 2019-09-23

## 2019-09-23 DIAGNOSIS — Z00.00 ROUTINE GENERAL MEDICAL EXAMINATION AT A HEALTH CARE FACILITY: Primary | ICD-10-CM

## 2019-09-25 ENCOUNTER — PATIENT MESSAGE (OUTPATIENT)
Dept: INTERNAL MEDICINE | Facility: CLINIC | Age: 59
End: 2019-09-25

## 2019-09-25 DIAGNOSIS — Z00.00 ROUTINE GENERAL MEDICAL EXAMINATION AT A HEALTH CARE FACILITY: Primary | ICD-10-CM

## 2019-10-16 ENCOUNTER — PATIENT MESSAGE (OUTPATIENT)
Dept: INTERNAL MEDICINE | Facility: CLINIC | Age: 59
End: 2019-10-16

## 2019-10-16 ENCOUNTER — OFFICE VISIT (OUTPATIENT)
Dept: INTERNAL MEDICINE | Facility: CLINIC | Age: 59
End: 2019-10-16
Payer: COMMERCIAL

## 2019-10-16 VITALS
HEART RATE: 88 BPM | OXYGEN SATURATION: 99 % | SYSTOLIC BLOOD PRESSURE: 124 MMHG | HEIGHT: 60 IN | DIASTOLIC BLOOD PRESSURE: 68 MMHG | BODY MASS INDEX: 57.52 KG/M2 | WEIGHT: 293 LBS

## 2019-10-16 DIAGNOSIS — J06.9 UPPER RESPIRATORY TRACT INFECTION, UNSPECIFIED TYPE: ICD-10-CM

## 2019-10-16 DIAGNOSIS — E11.42 TYPE 2 DIABETES MELLITUS WITH DIABETIC POLYNEUROPATHY, WITHOUT LONG-TERM CURRENT USE OF INSULIN: ICD-10-CM

## 2019-10-16 DIAGNOSIS — E66.01 MORBID OBESITY: ICD-10-CM

## 2019-10-16 DIAGNOSIS — I10 ESSENTIAL HYPERTENSION, BENIGN: Primary | ICD-10-CM

## 2019-10-16 DIAGNOSIS — E03.9 HYPOTHYROIDISM, UNSPECIFIED TYPE: ICD-10-CM

## 2019-10-16 DIAGNOSIS — E78.2 MIXED HYPERLIPIDEMIA: ICD-10-CM

## 2019-10-16 PROBLEM — Z08 ENCOUNTER FOR FOLLOW-UP SURVEILLANCE OF ENDOMETRIAL CANCER: Status: RESOLVED | Noted: 2019-09-10 | Resolved: 2019-10-16

## 2019-10-16 PROBLEM — Z85.42 ENCOUNTER FOR FOLLOW-UP SURVEILLANCE OF ENDOMETRIAL CANCER: Status: RESOLVED | Noted: 2019-09-10 | Resolved: 2019-10-16

## 2019-10-16 PROCEDURE — 99999 PR PBB SHADOW E&M-EST. PATIENT-LVL IV: CPT | Mod: PBBFAC,,, | Performed by: INTERNAL MEDICINE

## 2019-10-16 PROCEDURE — 90686 IIV4 VACC NO PRSV 0.5 ML IM: CPT | Mod: S$GLB,,, | Performed by: INTERNAL MEDICINE

## 2019-10-16 PROCEDURE — 99214 OFFICE O/P EST MOD 30 MIN: CPT | Mod: 25,S$GLB,, | Performed by: INTERNAL MEDICINE

## 2019-10-16 PROCEDURE — 99999 PR PBB SHADOW E&M-EST. PATIENT-LVL IV: ICD-10-PCS | Mod: PBBFAC,,, | Performed by: INTERNAL MEDICINE

## 2019-10-16 PROCEDURE — 99214 PR OFFICE/OUTPT VISIT, EST, LEVL IV, 30-39 MIN: ICD-10-PCS | Mod: 25,S$GLB,, | Performed by: INTERNAL MEDICINE

## 2019-10-16 PROCEDURE — 90471 IMMUNIZATION ADMIN: CPT | Mod: S$GLB,,, | Performed by: INTERNAL MEDICINE

## 2019-10-16 PROCEDURE — 90686 FLU VACCINE (QUAD) GREATER THAN OR EQUAL TO 3YO PRESERVATIVE FREE IM: ICD-10-PCS | Mod: S$GLB,,, | Performed by: INTERNAL MEDICINE

## 2019-10-16 PROCEDURE — 90471 FLU VACCINE (QUAD) GREATER THAN OR EQUAL TO 3YO PRESERVATIVE FREE IM: ICD-10-PCS | Mod: S$GLB,,, | Performed by: INTERNAL MEDICINE

## 2019-10-16 RX ORDER — FLUTICASONE PROPIONATE AND SALMETEROL 100; 50 UG/1; UG/1
1 POWDER RESPIRATORY (INHALATION) 2 TIMES DAILY
COMMUNITY
End: 2020-08-30 | Stop reason: SDUPTHER

## 2019-10-16 RX ORDER — VITAMIN B COMPLEX
1 CAPSULE ORAL DAILY
COMMUNITY

## 2019-10-16 RX ORDER — ATORVASTATIN CALCIUM 80 MG/1
80 TABLET, FILM COATED ORAL DAILY
Qty: 90 TABLET | Refills: 3 | Status: SHIPPED | OUTPATIENT
Start: 2019-10-16 | End: 2020-10-04 | Stop reason: SDUPTHER

## 2019-10-16 RX ORDER — CODEINE PHOSPHATE AND GUAIFENESIN 10; 100 MG/5ML; MG/5ML
5 SOLUTION ORAL 3 TIMES DAILY PRN
Qty: 120 ML | Refills: 0 | Status: SHIPPED | OUTPATIENT
Start: 2019-10-16 | End: 2019-10-26

## 2019-10-16 RX ORDER — ERGOCALCIFEROL 1.25 MG/1
50000 CAPSULE ORAL
COMMUNITY
End: 2021-08-16

## 2019-10-16 NOTE — PROGRESS NOTES
Subjective:       Patient ID: Mela Manriquez is a 59 y.o. female.    Chief Complaint: Follow-up (six month f/u, mammo labs done (flushot needed ))    HPI  Wellness check.  Chart reviewed.  Weight stable.  Fighting a cold.  No CP, SOB, F/C. Still with pedal paresthesias.  Eye check due.  No anxiety.  No abd pain.  Review of Systems   All other systems reviewed and are negative.      Objective:      Physical Exam   Constitutional: She appears well-developed.   obese   HENT:   Head: Normocephalic.   Eyes: EOM are normal.   Neck: Normal range of motion.   Cardiovascular: Normal rate, regular rhythm, normal heart sounds and intact distal pulses.   Pulses:       Dorsalis pedis pulses are 2+ on the right side, and 2+ on the left side.        Posterior tibial pulses are 2+ on the right side, and 2+ on the left side.   Pulmonary/Chest: Effort normal and breath sounds normal.   Abdominal: Soft. Bowel sounds are normal. She exhibits no distension. There is no tenderness.   Musculoskeletal: Normal range of motion. She exhibits no edema.        Right foot: There is normal range of motion and no deformity.        Left foot: There is normal range of motion and no deformity.   Feet:   Right Foot:   Protective Sensation: 0 sites tested. 0 sites sensed.   Left Foot:   Protective Sensation: 0 sites tested. 0 sites sensed.   Lymphadenopathy:     She has no cervical adenopathy.   Neurological: She is alert. No cranial nerve deficit. She exhibits normal muscle tone. Coordination normal.   Skin: Skin is warm and dry.   Psychiatric: She has a normal mood and affect. Her behavior is normal.   Vitals reviewed.      Assessment:       1. Essential hypertension, benign    2. Hypothyroidism, unspecified type    3. Mixed hyperlipidemia    4. Type 2 diabetes mellitus with diabetic polyneuropathy, without long-term current use of insulin    5. Morbid obesity    6. Upper respiratory tract infection, unspecified type        Plan:       Mela was  seen today for follow-up.    Diagnoses and all orders for this visit:    Essential hypertension, benign   Well-cont    Hypothyroidism, unspecified type   Euthyroid    Mixed hyperlipidemia  -     Lipid panel; Future  -     atorvastatin (LIPITOR) 80 MG tablet; Take 1 tablet (80 mg total) by mouth once daily.    Type 2 diabetes mellitus with diabetic polyneuropathy, without long-term current use of insulin  -     Influenza - Quadrivalent (PF)  -     Hemoglobin A1c; Future    Morbid obesity   Urged weight loss    Upper respiratory tract infection, unspecified type  -     guaifenesin-codeine 100-10 mg/5 ml (CHERATUSSIN AC)  mg/5 mL syrup; Take 5 mLs by mouth 3 (three) times daily as needed for Cough.      Follow up in about 6 months (around 4/16/2020).

## 2019-10-18 DIAGNOSIS — I10 ESSENTIAL HYPERTENSION, BENIGN: ICD-10-CM

## 2019-10-21 RX ORDER — LISINOPRIL 20 MG/1
TABLET ORAL
Qty: 90 TABLET | Refills: 3 | Status: SHIPPED | OUTPATIENT
Start: 2019-10-21 | End: 2020-10-04 | Stop reason: SDUPTHER

## 2019-10-24 RX ORDER — GABAPENTIN 300 MG/1
CAPSULE ORAL
Qty: 90 CAPSULE | Refills: 4 | Status: SHIPPED | OUTPATIENT
Start: 2019-10-24 | End: 2021-12-08 | Stop reason: SDUPTHER

## 2019-11-06 DIAGNOSIS — E03.9 HYPOTHYROIDISM, UNSPECIFIED TYPE: ICD-10-CM

## 2019-11-06 RX ORDER — LEVOTHYROXINE SODIUM 50 UG/1
TABLET ORAL
Qty: 90 TABLET | Refills: 4 | Status: SHIPPED | OUTPATIENT
Start: 2019-11-06 | End: 2020-10-18 | Stop reason: SDUPTHER

## 2019-12-06 ENCOUNTER — TELEPHONE (OUTPATIENT)
Dept: ADMINISTRATIVE | Facility: OTHER | Age: 59
End: 2019-12-06

## 2019-12-09 ENCOUNTER — OFFICE VISIT (OUTPATIENT)
Dept: GYNECOLOGIC ONCOLOGY | Facility: CLINIC | Age: 59
End: 2019-12-09
Payer: COMMERCIAL

## 2019-12-09 VITALS
BODY MASS INDEX: 57.52 KG/M2 | HEIGHT: 60 IN | SYSTOLIC BLOOD PRESSURE: 142 MMHG | HEART RATE: 68 BPM | DIASTOLIC BLOOD PRESSURE: 70 MMHG | WEIGHT: 293 LBS

## 2019-12-09 DIAGNOSIS — C54.1 ENDOMETRIAL CANCER: Primary | ICD-10-CM

## 2019-12-09 PROCEDURE — 99999 PR PBB SHADOW E&M-EST. PATIENT-LVL III: CPT | Mod: PBBFAC,,, | Performed by: OBSTETRICS & GYNECOLOGY

## 2019-12-09 PROCEDURE — 99999 PR PBB SHADOW E&M-EST. PATIENT-LVL III: ICD-10-PCS | Mod: PBBFAC,,, | Performed by: OBSTETRICS & GYNECOLOGY

## 2019-12-09 PROCEDURE — 99214 OFFICE O/P EST MOD 30 MIN: CPT | Mod: S$GLB,,, | Performed by: OBSTETRICS & GYNECOLOGY

## 2019-12-09 PROCEDURE — 99214 PR OFFICE/OUTPT VISIT, EST, LEVL IV, 30-39 MIN: ICD-10-PCS | Mod: S$GLB,,, | Performed by: OBSTETRICS & GYNECOLOGY

## 2019-12-22 NOTE — PROGRESS NOTES
Subjective:      Patient ID: Mela Manriquez is a 59 y.o. female.    Chief Complaint: Follow-up (3 month)      HPI  Presents today for routine surveillance visit for Stage IA, grade 2 endometrioid type endometrial adenocarcinoma.   Doing well with no GYN complaints. Specifically denies N/V, pain, swelling, bleeding, unexpected weight change, SOB, problems with bowel or bladder function.   Disease free interval 1 year     MMG 10/2019 per PCP  Colonoscopy: 7 yrs ago per pt. Cologuard normal.     History:  Ms. Manriquez is a 58yr old para 2 (vag del x 2) referred from Dr. Call for Gr 1 endometrial cancer found on EMBx. Patient reports LMP at age 48, started bleeding on 10/1/2018 sometimes heavy with clots.     Pelvic US   Uterus 11 x 6 x 6cm with NATALIE 3.3cm  Bilateral ovaries not visualized     EMBx sound to 6cm, Endometrial adenocarcinoma, endometrioid type, FIGO grade 1.     Last pap smear 10/3/2018 Atypical glandular cells, favor a neoplastic process. No prior history for abnormal pap smears.     Surgical history: open appendectomy and cholecystectomy, 1976     Family history negative for breast, ovarian or colon CA. Niece - uterine vs cervical CA (30s), another niece - bone CA (24)     S/p RTLH/BSO/PLND 11/30/18. Uncomplicated post op course.   Final pathology Stage IA, grade 2 endometrioid type endometrial adenocarcinoma, negative lymph nodes. Low risk, no adjuvant treatment.   Review of Systems   Constitutional: Negative for appetite change, chills, fatigue and fever.   HENT: Negative for mouth sores.    Respiratory: Negative for cough and shortness of breath.    Cardiovascular: Negative for leg swelling.   Gastrointestinal: Negative for abdominal pain, blood in stool, constipation and diarrhea.   Endocrine: Negative for cold intolerance.   Genitourinary: Negative for dysuria and vaginal bleeding.   Musculoskeletal: Negative for myalgias.   Skin: Negative for rash.   Allergic/Immunologic: Negative.    Neurological:  Negative for weakness and numbness.   Hematological: Negative for adenopathy. Does not bruise/bleed easily.   Psychiatric/Behavioral: Negative for confusion.       Objective:   Physical Exam:   Constitutional: She is oriented to person, place, and time. She appears well-developed and well-nourished.    HENT:   Head: Normocephalic and atraumatic.    Eyes: Pupils are equal, round, and reactive to light. EOM are normal.    Neck: Normal range of motion. Neck supple. No thyromegaly present.    Cardiovascular: Normal rate, regular rhythm and intact distal pulses.     Pulmonary/Chest: Effort normal and breath sounds normal. No respiratory distress. She has no wheezes.        Abdominal: Soft. Bowel sounds are normal. She exhibits no distension, no ascites and no mass. There is no tenderness.     Genitourinary: Rectum normal and vagina normal. Pelvic exam was performed with patient supine. There is no lesion on the right labia. There is no lesion on the left labia. Uterus is absent. There is an absent adnexa. Right adnexum displays no mass. Left adnexum displays no mass. Vaginal cuff normal.Cervix exhibits absence.           Musculoskeletal: Normal range of motion and moves all extremeties.      Lymphadenopathy:     She has no cervical adenopathy.        Right: No inguinal and no supraclavicular adenopathy present.        Left: No inguinal and no supraclavicular adenopathy present.    Neurological: She is alert and oriented to person, place, and time.    Skin: Skin is warm and dry. No rash noted.    Psychiatric: She has a normal mood and affect.       Assessment:     1. Endometrial cancer        Plan:   No orders of the defined types were placed in this encounter.    No evidence of disease on today's exam  Feels well no new symptoms  Disease free interval 10 months    Recommend continued surveillance. RTC in 3 months or sooner if needed

## 2020-03-06 ENCOUNTER — TELEPHONE (OUTPATIENT)
Dept: ADMINISTRATIVE | Facility: OTHER | Age: 60
End: 2020-03-06

## 2020-03-25 DIAGNOSIS — J30.9 ALLERGIC RHINITIS: ICD-10-CM

## 2020-03-25 RX ORDER — FLUTICASONE PROPIONATE 50 MCG
SPRAY, SUSPENSION (ML) NASAL
Qty: 32 G | Refills: 3 | Status: SHIPPED | OUTPATIENT
Start: 2020-03-25 | End: 2021-03-08 | Stop reason: SDUPTHER

## 2020-04-27 ENCOUNTER — OFFICE VISIT (OUTPATIENT)
Dept: FAMILY MEDICINE | Facility: CLINIC | Age: 60
End: 2020-04-27
Payer: COMMERCIAL

## 2020-04-27 ENCOUNTER — TELEPHONE (OUTPATIENT)
Dept: FAMILY MEDICINE | Facility: CLINIC | Age: 60
End: 2020-04-27

## 2020-04-27 VITALS
DIASTOLIC BLOOD PRESSURE: 86 MMHG | TEMPERATURE: 99 F | WEIGHT: 292.56 LBS | HEART RATE: 99 BPM | OXYGEN SATURATION: 97 % | HEIGHT: 60 IN | BODY MASS INDEX: 57.44 KG/M2 | SYSTOLIC BLOOD PRESSURE: 134 MMHG

## 2020-04-27 DIAGNOSIS — I10 ESSENTIAL HYPERTENSION, BENIGN: ICD-10-CM

## 2020-04-27 DIAGNOSIS — B02.9 HERPES ZOSTER WITHOUT COMPLICATION: Primary | ICD-10-CM

## 2020-04-27 PROCEDURE — 99213 PR OFFICE/OUTPT VISIT, EST, LEVL III, 20-29 MIN: ICD-10-PCS | Mod: S$GLB,,, | Performed by: INTERNAL MEDICINE

## 2020-04-27 PROCEDURE — 99213 OFFICE O/P EST LOW 20 MIN: CPT | Mod: S$GLB,,, | Performed by: INTERNAL MEDICINE

## 2020-04-27 RX ORDER — HYDROCODONE BITARTRATE AND ACETAMINOPHEN 5; 325 MG/1; MG/1
1 TABLET ORAL EVERY 6 HOURS PRN
Qty: 20 TABLET | Refills: 0 | Status: SHIPPED | OUTPATIENT
Start: 2020-04-27 | End: 2021-08-16

## 2020-04-27 RX ORDER — FAMCICLOVIR 500 MG/1
500 TABLET ORAL 3 TIMES DAILY
Qty: 21 TABLET | Refills: 0 | Status: SHIPPED | OUTPATIENT
Start: 2020-04-27 | End: 2020-05-04

## 2020-04-27 NOTE — TELEPHONE ENCOUNTER
----- Message from Gracie Hart sent at 4/27/2020  7:42 AM CDT -----  Contact: [phone]'s  Slava  Type:  Needs Medical Advice    Who Called:Pt's    Symptoms (please be specific):Shingles   How long has patient had these symptoms:1day  Would the patient rather a call back or a response via [facility]?callback  Best Call Back Number:[phone]  Additional Information:Pt's  is requesting a callback.

## 2020-04-27 NOTE — PROGRESS NOTES
Subjective:       Patient ID: Mela Manriquez is a 60 y.o. female.    Chief Complaint: Herpes Zoster (possible)    HPI  Pt c/o painful rash on r side x 1 day.  No d/c, f/C.  Angela down 16 lbs/ 6 mo.  Review of Systems   All other systems reviewed and are negative.      Objective:      Physical Exam   Constitutional: She appears well-developed.   obese   HENT:   Head: Normocephalic.   Eyes: EOM are normal.   Neck: Normal range of motion.   Cardiovascular: Normal rate, regular rhythm and intact distal pulses.   Pulmonary/Chest: Effort normal.   Abdominal: Bowel sounds are normal.   Musculoskeletal: Normal range of motion.   Lymphadenopathy:     She has no cervical adenopathy.   Neurological: She is alert. No cranial nerve deficit. She exhibits normal muscle tone. Coordination normal.   Skin: Skin is warm and dry. Rash (zoster R T12 dist) noted.   Psychiatric: She has a normal mood and affect. Her behavior is normal.   Vitals reviewed.      Assessment:       1. Herpes zoster without complication    2. Essential hypertension, benign        Plan:       Mela was seen today for herpes zoster.    Diagnoses and all orders for this visit:    Herpes zoster without complication  -     famciclovir (FAMVIR) 500 MG tablet; Take 1 tablet (500 mg total) by mouth 3 (three) times daily. for 7 days  -     HYDROcodone-acetaminophen (NORCO) 5-325 mg per tablet; Take 1 tablet by mouth every 6 (six) hours as needed for Pain.    Essential hypertension, benign    monitor    Follow up if symptoms worsen or fail to improve.

## 2020-08-28 ENCOUNTER — PATIENT MESSAGE (OUTPATIENT)
Dept: INTERNAL MEDICINE | Facility: CLINIC | Age: 60
End: 2020-08-28

## 2020-08-31 RX ORDER — FLUTICASONE PROPIONATE AND SALMETEROL 100; 50 UG/1; UG/1
1 POWDER RESPIRATORY (INHALATION) 2 TIMES DAILY
Qty: 14 EACH | Refills: 4 | Status: SHIPPED | OUTPATIENT
Start: 2020-08-31 | End: 2022-07-08 | Stop reason: SDUPTHER

## 2020-09-01 ENCOUNTER — TELEPHONE (OUTPATIENT)
Dept: INTERNAL MEDICINE | Facility: CLINIC | Age: 60
End: 2020-09-01

## 2020-09-01 NOTE — TELEPHONE ENCOUNTER
----- Message from Venita Pena sent at 9/1/2020 10:46 AM CDT -----  Contact: Deann @ [facility] [place]-[phone]  Type:  Pharmacy Calling to Clarify an RX    Name of Caller: Deann  Pharmacy Name: [facility]  Prescription Name:fluticasone-salmeterol diskus inhaler 100-50 mcg  What do they need to clarify?: regarding verifying the Quantity of the medication  Best Call Back Number:[phone] ref # [id number]

## 2020-10-23 DIAGNOSIS — Z12.31 OTHER SCREENING MAMMOGRAM: ICD-10-CM

## 2020-11-18 ENCOUNTER — PATIENT MESSAGE (OUTPATIENT)
Dept: ADMINISTRATIVE | Facility: HOSPITAL | Age: 60
End: 2020-11-18

## 2021-02-17 DIAGNOSIS — E11.9 TYPE 2 DIABETES MELLITUS WITHOUT COMPLICATION: ICD-10-CM

## 2021-02-18 ENCOUNTER — PATIENT MESSAGE (OUTPATIENT)
Dept: ADMINISTRATIVE | Facility: HOSPITAL | Age: 61
End: 2021-02-18

## 2021-02-19 RX ORDER — IBUPROFEN 800 MG/1
800 TABLET ORAL 3 TIMES DAILY
Qty: 90 TABLET | Refills: 4 | Status: SHIPPED | OUTPATIENT
Start: 2021-02-19 | End: 2021-02-22 | Stop reason: SDUPTHER

## 2021-02-22 RX ORDER — IBUPROFEN 800 MG/1
800 TABLET ORAL 3 TIMES DAILY
Qty: 90 TABLET | Refills: 4 | Status: SHIPPED | OUTPATIENT
Start: 2021-02-22 | End: 2022-08-24 | Stop reason: SDUPTHER

## 2021-02-24 ENCOUNTER — IMMUNIZATION (OUTPATIENT)
Dept: INTERNAL MEDICINE | Facility: CLINIC | Age: 61
End: 2021-02-24
Payer: COMMERCIAL

## 2021-02-24 DIAGNOSIS — E11.9 TYPE 2 DIABETES MELLITUS WITHOUT COMPLICATION, UNSPECIFIED WHETHER LONG TERM INSULIN USE: ICD-10-CM

## 2021-02-24 DIAGNOSIS — Z23 NEED FOR VACCINATION: Primary | ICD-10-CM

## 2021-02-24 PROCEDURE — 0001A COVID-19, MRNA, LNP-S, PF, 30 MCG/0.3 ML DOSE VACCINE: ICD-10-PCS | Mod: CV19,S$GLB,, | Performed by: INTERNAL MEDICINE

## 2021-02-24 PROCEDURE — 0001A COVID-19, MRNA, LNP-S, PF, 30 MCG/0.3 ML DOSE VACCINE: CPT | Mod: CV19,S$GLB,, | Performed by: INTERNAL MEDICINE

## 2021-02-24 PROCEDURE — 91300 COVID-19, MRNA, LNP-S, PF, 30 MCG/0.3 ML DOSE VACCINE: ICD-10-PCS | Mod: S$GLB,,, | Performed by: INTERNAL MEDICINE

## 2021-02-24 PROCEDURE — 91300 COVID-19, MRNA, LNP-S, PF, 30 MCG/0.3 ML DOSE VACCINE: CPT | Mod: S$GLB,,, | Performed by: INTERNAL MEDICINE

## 2021-03-17 ENCOUNTER — IMMUNIZATION (OUTPATIENT)
Dept: INTERNAL MEDICINE | Facility: CLINIC | Age: 61
End: 2021-03-17
Payer: COMMERCIAL

## 2021-03-17 DIAGNOSIS — Z23 NEED FOR VACCINATION: Primary | ICD-10-CM

## 2021-03-17 PROCEDURE — 0002A COVID-19, MRNA, LNP-S, PF, 30 MCG/0.3 ML DOSE VACCINE: CPT | Mod: CV19,S$GLB,, | Performed by: INTERNAL MEDICINE

## 2021-03-17 PROCEDURE — 91300 COVID-19, MRNA, LNP-S, PF, 30 MCG/0.3 ML DOSE VACCINE: ICD-10-PCS | Mod: S$GLB,,, | Performed by: INTERNAL MEDICINE

## 2021-03-17 PROCEDURE — 0002A COVID-19, MRNA, LNP-S, PF, 30 MCG/0.3 ML DOSE VACCINE: ICD-10-PCS | Mod: CV19,S$GLB,, | Performed by: INTERNAL MEDICINE

## 2021-03-17 PROCEDURE — 91300 COVID-19, MRNA, LNP-S, PF, 30 MCG/0.3 ML DOSE VACCINE: CPT | Mod: S$GLB,,, | Performed by: INTERNAL MEDICINE

## 2021-08-10 ENCOUNTER — OFFICE VISIT (OUTPATIENT)
Dept: GYNECOLOGIC ONCOLOGY | Facility: CLINIC | Age: 61
End: 2021-08-10
Payer: COMMERCIAL

## 2021-08-10 VITALS
DIASTOLIC BLOOD PRESSURE: 85 MMHG | WEIGHT: 293 LBS | BODY MASS INDEX: 58.08 KG/M2 | SYSTOLIC BLOOD PRESSURE: 137 MMHG | HEART RATE: 77 BPM

## 2021-08-10 DIAGNOSIS — C54.1 ENDOMETRIAL CANCER: Primary | ICD-10-CM

## 2021-08-10 PROCEDURE — 99999 PR PBB SHADOW E&M-EST. PATIENT-LVL III: ICD-10-PCS | Mod: PBBFAC,,, | Performed by: OBSTETRICS & GYNECOLOGY

## 2021-08-10 PROCEDURE — 99999 PR PBB SHADOW E&M-EST. PATIENT-LVL III: CPT | Mod: PBBFAC,,, | Performed by: OBSTETRICS & GYNECOLOGY

## 2021-08-10 PROCEDURE — 99214 PR OFFICE/OUTPT VISIT, EST, LEVL IV, 30-39 MIN: ICD-10-PCS | Mod: S$GLB,,, | Performed by: OBSTETRICS & GYNECOLOGY

## 2021-08-10 PROCEDURE — 99214 OFFICE O/P EST MOD 30 MIN: CPT | Mod: S$GLB,,, | Performed by: OBSTETRICS & GYNECOLOGY

## 2021-08-17 PROBLEM — C54.1 ENDOMETRIAL CANCER: Chronic | Status: ACTIVE | Noted: 2018-10-22

## 2021-08-17 PROBLEM — M79.7 FIBROMYALGIA: Chronic | Status: ACTIVE | Noted: 2018-03-13

## 2021-08-18 ENCOUNTER — HOSPITAL ENCOUNTER (OUTPATIENT)
Dept: RADIOLOGY | Facility: HOSPITAL | Age: 61
Discharge: HOME | End: 2021-08-18
Attending: INTERNAL MEDICINE
Payer: COMMERCIAL

## 2021-08-18 DIAGNOSIS — Z12.31 OTHER SCREENING MAMMOGRAM: ICD-10-CM

## 2021-08-18 PROCEDURE — 77063 BREAST TOMOSYNTHESIS BI: CPT | Mod: 26,,, | Performed by: RADIOLOGY

## 2021-08-18 PROCEDURE — 77063 MAMMO DIGITAL SCREENING BILAT WITH TOMO: ICD-10-PCS | Mod: 26,,, | Performed by: RADIOLOGY

## 2021-08-18 PROCEDURE — 77067 SCR MAMMO BI INCL CAD: CPT | Mod: 26,,, | Performed by: RADIOLOGY

## 2021-08-18 PROCEDURE — 77067 MAMMO DIGITAL SCREENING BILAT WITH TOMO: ICD-10-PCS | Mod: 26,,, | Performed by: RADIOLOGY

## 2021-08-18 PROCEDURE — 77067 SCR MAMMO BI INCL CAD: CPT | Mod: TC

## 2021-08-20 ENCOUNTER — HOSPITAL ENCOUNTER (OUTPATIENT)
Dept: RADIOLOGY | Facility: HOSPITAL | Age: 61
Discharge: HOME | End: 2021-08-20
Attending: FAMILY MEDICINE
Payer: COMMERCIAL

## 2021-08-20 ENCOUNTER — HOSPITAL ENCOUNTER (OUTPATIENT)
Dept: CARDIOLOGY | Facility: HOSPITAL | Age: 61
Discharge: HOME | End: 2021-08-20
Attending: FAMILY MEDICINE
Payer: COMMERCIAL

## 2021-08-20 VITALS — WEIGHT: 293 LBS | BODY MASS INDEX: 57.52 KG/M2 | HEIGHT: 60 IN

## 2021-08-20 DIAGNOSIS — E03.9 HYPOTHYROIDISM, UNSPECIFIED TYPE: ICD-10-CM

## 2021-08-20 DIAGNOSIS — R01.1 HEART MURMUR: ICD-10-CM

## 2021-08-20 PROCEDURE — 93306 TTE W/DOPPLER COMPLETE: CPT

## 2021-08-20 PROCEDURE — 76536 US EXAM OF HEAD AND NECK: CPT | Mod: 26,,, | Performed by: RADIOLOGY

## 2021-08-20 PROCEDURE — 76536 US EXAM OF HEAD AND NECK: CPT | Mod: TC

## 2021-08-20 PROCEDURE — 76536 US THYROID: ICD-10-PCS | Mod: 26,,, | Performed by: RADIOLOGY

## 2021-08-21 LAB
AORTIC ROOT ANNULUS: 2.77 CM
AV INDEX (PROSTH): 0.33
AV MEAN GRADIENT: 20 MMHG
AV PEAK GRADIENT: 31 MMHG
AV VALVE AREA: 1.13 CM2
AV VELOCITY RATIO: 0.34
BSA FOR ECHO PROCEDURE: 2.4 M2
CV ECHO LV RWT: 0.45 CM
DOP CALC AO PEAK VEL: 2.8 M/S
DOP CALC AO VTI: 61.27 CM
DOP CALC LVOT AREA: 3.4 CM2
DOP CALC LVOT DIAMETER: 2.08 CM
DOP CALC LVOT PEAK VEL: 0.96 M/S
DOP CALC LVOT STROKE VOLUME: 69.18 CM3
DOP CALCLVOT PEAK VEL VTI: 20.37 CM
E WAVE DECELERATION TIME: 248.95 MSEC
E/A RATIO: 1.05
E/E' RATIO: 17.11 M/S
ECHO LV POSTERIOR WALL: 0.99 CM (ref 0.6–1.1)
EJECTION FRACTION: 60 %
FRACTIONAL SHORTENING: 34 % (ref 28–44)
INTERVENTRICULAR SEPTUM: 1.18 CM (ref 0.6–1.1)
LA MAJOR: 4.61 CM
LA MINOR: 4.92 CM
LA WIDTH: 3.59 CM
LEFT ATRIUM SIZE: 3.52 CM
LEFT ATRIUM VOLUME INDEX MOD: 15.3 ML/M2
LEFT ATRIUM VOLUME INDEX: 23.1 ML/M2
LEFT ATRIUM VOLUME MOD: 33.76 CM3
LEFT ATRIUM VOLUME: 51.13 CM3
LEFT INTERNAL DIMENSION IN SYSTOLE: 2.91 CM (ref 2.1–4)
LEFT VENTRICLE DIASTOLIC VOLUME INDEX: 40.33 ML/M2
LEFT VENTRICLE DIASTOLIC VOLUME: 89.13 ML
LEFT VENTRICLE MASS INDEX: 76 G/M2
LEFT VENTRICLE SYSTOLIC VOLUME INDEX: 14.7 ML/M2
LEFT VENTRICLE SYSTOLIC VOLUME: 32.43 ML
LEFT VENTRICULAR INTERNAL DIMENSION IN DIASTOLE: 4.43 CM (ref 3.5–6)
LEFT VENTRICULAR MASS: 167.46 G
LV LATERAL E/E' RATIO: 19.25 M/S
LV SEPTAL E/E' RATIO: 15.4 M/S
MV A" WAVE DURATION": 9.42 MSEC
MV MEAN GRADIENT: 1 MMHG
MV PEAK A VEL: 0.73 M/S
MV PEAK E VEL: 0.77 M/S
MV PEAK GRADIENT: 2 MMHG
MV STENOSIS PRESSURE HALF TIME: 72.2 MS
MV VALVE AREA P 1/2 METHOD: 3.05 CM2
PULM VEIN S/D RATIO: 1.23
PV PEAK D VEL: 0.4 M/S
PV PEAK S VEL: 0.49 M/S
PV PEAK VELOCITY: 1.48 CM/S
RA MAJOR: 3.84 CM
RA PRESSURE: 3 MMHG
RA WIDTH: 2.66 CM
RIGHT VENTRICULAR END-DIASTOLIC DIMENSION: 2.75 CM
RV TISSUE DOPPLER FREE WALL SYSTOLIC VELOCITY 1 (APICAL 4 CHAMBER VIEW): 11.47 CM/S
TDI LATERAL: 0.04 M/S
TDI SEPTAL: 0.05 M/S
TDI: 0.05 M/S
TRICUSPID ANNULAR PLANE SYSTOLIC EXCURSION: 2.09 CM

## 2021-12-14 ENCOUNTER — IMMUNIZATION (OUTPATIENT)
Dept: FAMILY MEDICINE | Facility: CLINIC | Age: 61
End: 2021-12-14
Payer: COMMERCIAL

## 2021-12-14 DIAGNOSIS — Z23 NEED FOR VACCINATION: Primary | ICD-10-CM

## 2021-12-14 PROCEDURE — 0004A COVID-19, MRNA, LNP-S, PF, 30 MCG/0.3 ML DOSE VACCINE: CPT | Mod: PBBFAC | Performed by: FAMILY MEDICINE

## 2022-02-09 PROBLEM — I35.0 AORTIC VALVE STENOSIS: Status: ACTIVE | Noted: 2022-02-09

## 2022-02-15 ENCOUNTER — OFFICE VISIT (OUTPATIENT)
Dept: GYNECOLOGIC ONCOLOGY | Facility: CLINIC | Age: 62
End: 2022-02-15
Payer: COMMERCIAL

## 2022-02-15 VITALS
HEART RATE: 66 BPM | BODY MASS INDEX: 56.14 KG/M2 | DIASTOLIC BLOOD PRESSURE: 60 MMHG | WEIGHT: 287.5 LBS | SYSTOLIC BLOOD PRESSURE: 134 MMHG

## 2022-02-15 DIAGNOSIS — C54.1 ENDOMETRIAL CANCER: Primary | Chronic | ICD-10-CM

## 2022-02-15 PROCEDURE — 99999 PR PBB SHADOW E&M-EST. PATIENT-LVL III: CPT | Mod: PBBFAC,,, | Performed by: OBSTETRICS & GYNECOLOGY

## 2022-02-15 PROCEDURE — 99214 PR OFFICE/OUTPT VISIT, EST, LEVL IV, 30-39 MIN: ICD-10-PCS | Mod: S$GLB,,, | Performed by: OBSTETRICS & GYNECOLOGY

## 2022-02-15 PROCEDURE — 99214 OFFICE O/P EST MOD 30 MIN: CPT | Mod: S$GLB,,, | Performed by: OBSTETRICS & GYNECOLOGY

## 2022-02-15 PROCEDURE — 99999 PR PBB SHADOW E&M-EST. PATIENT-LVL III: ICD-10-PCS | Mod: PBBFAC,,, | Performed by: OBSTETRICS & GYNECOLOGY

## 2022-02-15 NOTE — PROGRESS NOTES
Subjective:      Patient ID: Mela Manriquez is a 61 y.o. female.    Chief Complaint: Follow-up      HPI  Presents today for surveillance visit for Stage IA, grade 2 endometrioid type endometrial adenocarcinoma.   Doing well with no GYN complaints. Specifically denies N/V, pain, swelling, bleeding, unexpected weight change, SOB, problems with bowel or bladder function.   Disease free interval 3.25 year     MMG per PCP 8/2021  Colonoscopy: 7 yrs ago per pt. Cologuard normal.     History:  Ms. Manriquez is a 58yr old para 2 (vag del x 2) referred from Dr. Call for Gr 1 endometrial cancer found on EMBx. Patient reports LMP at age 48, started bleeding on 10/1/2018 sometimes heavy with clots.     Pelvic US   Uterus 11 x 6 x 6cm with NATALIE 3.3cm  Bilateral ovaries not visualized     EMBx sound to 6cm, Endometrial adenocarcinoma, endometrioid type, FIGO grade 1.     Last pap smear 10/3/2018 Atypical glandular cells, favor a neoplastic process. No prior history for abnormal pap smears.     Surgical history: open appendectomy and cholecystectomy, 1976     Family history negative for breast, ovarian or colon CA. Niece - uterine vs cervical CA (30s), another niece - bone CA (24)     S/p RTLH/BSO/PLND 11/30/18. Uncomplicated post op course.   Final pathology Stage IA, grade 2 endometrioid type endometrial adenocarcinoma, negative lymph nodes. Low risk, no adjuvant treatment.   Review of Systems   Constitutional: Negative for appetite change, chills, fatigue and fever.   HENT: Negative for mouth sores.    Respiratory: Negative for cough and shortness of breath.    Cardiovascular: Negative for leg swelling.   Gastrointestinal: Negative for abdominal pain, blood in stool, constipation and diarrhea.   Endocrine: Negative for cold intolerance.   Genitourinary: Negative for dysuria and vaginal bleeding.   Musculoskeletal: Negative for myalgias.   Skin: Negative for rash.   Allergic/Immunologic: Negative.    Neurological: Negative for  weakness and numbness.   Hematological: Negative for adenopathy. Does not bruise/bleed easily.   Psychiatric/Behavioral: Negative for confusion.       Objective:   Physical Exam:   Constitutional: She is oriented to person, place, and time. She appears well-developed and well-nourished.    HENT:   Head: Normocephalic and atraumatic.    Eyes: Pupils are equal, round, and reactive to light. EOM are normal.    Neck: No thyromegaly present.    Cardiovascular: Normal rate, regular rhythm and intact distal pulses.     Pulmonary/Chest: Effort normal and breath sounds normal. No respiratory distress. She has no wheezes.        Abdominal: Soft. Bowel sounds are normal. She exhibits no distension, no ascites and no mass. There is no abdominal tenderness.     Genitourinary:    Vagina and rectum normal.      Pelvic exam was performed with patient supine.   There is no lesion on the right labia. There is no lesion on the left labia. There is an absent adnexa. Right adnexum displays no mass. Left adnexum displays no mass. Vaginal cuff normal.  Cervix is absent.Uterus is absent.           Musculoskeletal: Normal range of motion and moves all extremeties.      Lymphadenopathy:     She has no cervical adenopathy.        Right: No inguinal and no supraclavicular adenopathy present.        Left: No inguinal and no supraclavicular adenopathy present.    Neurological: She is alert and oriented to person, place, and time.    Skin: Skin is warm and dry. No rash noted.    Psychiatric: She has a normal mood and affect.       Assessment:     1. Endometrial cancer        Plan:   No orders of the defined types were placed in this encounter.    No evidence of disease on today's exam  Feels well, no new symptoms  Disease free interval 3.25 years     Recommend continued surveillance. RTC 6 months or sooner if needed.      I spent approximately 30 minutes reviewing the available records and evaluating the patient, out of which over 50% of the time  was spent face to face with the patient in counseling and coordinating this patient's care.

## 2022-02-18 ENCOUNTER — LAB VISIT (OUTPATIENT)
Dept: LAB | Facility: HOSPITAL | Age: 62
End: 2022-02-18
Attending: FAMILY MEDICINE
Payer: COMMERCIAL

## 2022-02-18 DIAGNOSIS — E11.9 TYPE 2 DIABETES MELLITUS WITHOUT COMPLICATION, WITHOUT LONG-TERM CURRENT USE OF INSULIN: Chronic | ICD-10-CM

## 2022-02-18 LAB
ALBUMIN/CREAT UR: NORMAL UG/MG (ref 0–30)
CREAT UR-MCNC: 37 MG/DL (ref 15–325)
MICROALBUMIN UR DL<=1MG/L-MCNC: <5 UG/ML

## 2022-02-18 PROCEDURE — 82043 UR ALBUMIN QUANTITATIVE: CPT | Performed by: FAMILY MEDICINE

## 2022-02-18 PROCEDURE — 82570 ASSAY OF URINE CREATININE: CPT | Performed by: FAMILY MEDICINE

## 2022-06-19 PROCEDURE — 99284 EMERGENCY DEPT VISIT MOD MDM: CPT

## 2022-06-20 ENCOUNTER — HOSPITAL ENCOUNTER (EMERGENCY)
Facility: HOSPITAL | Age: 62
Discharge: HOME | End: 2022-06-20
Attending: EMERGENCY MEDICINE
Payer: COMMERCIAL

## 2022-06-20 VITALS
HEIGHT: 60 IN | SYSTOLIC BLOOD PRESSURE: 148 MMHG | OXYGEN SATURATION: 97 % | WEIGHT: 270 LBS | BODY MASS INDEX: 53.01 KG/M2 | RESPIRATION RATE: 18 BRPM | DIASTOLIC BLOOD PRESSURE: 82 MMHG | TEMPERATURE: 99 F | HEART RATE: 80 BPM

## 2022-06-20 DIAGNOSIS — K08.89 DENTALGIA: ICD-10-CM

## 2022-06-20 DIAGNOSIS — K08.89 PAIN, DENTAL: Primary | ICD-10-CM

## 2022-06-20 PROCEDURE — 63600175 PHARM REV CODE 636 W HCPCS: Performed by: EMERGENCY MEDICINE

## 2022-06-20 PROCEDURE — 96372 THER/PROPH/DIAG INJ SC/IM: CPT | Performed by: EMERGENCY MEDICINE

## 2022-06-20 PROCEDURE — 25000003 PHARM REV CODE 250: Performed by: EMERGENCY MEDICINE

## 2022-06-20 RX ORDER — CHLORHEXIDINE GLUCONATE ORAL RINSE 1.2 MG/ML
15 SOLUTION DENTAL 2 TIMES DAILY
Qty: 473 ML | Refills: 0 | Status: SHIPPED | OUTPATIENT
Start: 2022-06-20 | End: 2022-07-04

## 2022-06-20 RX ORDER — AMOXICILLIN 875 MG/1
875 TABLET, FILM COATED ORAL 2 TIMES DAILY
Qty: 14 TABLET | Refills: 0 | Status: SHIPPED | OUTPATIENT
Start: 2022-06-20 | End: 2022-08-24 | Stop reason: ALTCHOICE

## 2022-06-20 RX ORDER — AMOXICILLIN 250 MG/1
1000 CAPSULE ORAL
Status: COMPLETED | OUTPATIENT
Start: 2022-06-20 | End: 2022-06-20

## 2022-06-20 RX ORDER — HYDROCODONE BITARTRATE AND ACETAMINOPHEN 5; 325 MG/1; MG/1
1 TABLET ORAL
Status: COMPLETED | OUTPATIENT
Start: 2022-06-20 | End: 2022-06-20

## 2022-06-20 RX ORDER — HYDROCODONE BITARTRATE AND ACETAMINOPHEN 5; 325 MG/1; MG/1
1 TABLET ORAL EVERY 6 HOURS PRN
Qty: 5 TABLET | Refills: 0 | Status: SHIPPED | OUTPATIENT
Start: 2022-06-20 | End: 2022-06-23

## 2022-06-20 RX ORDER — DEXAMETHASONE SODIUM PHOSPHATE 4 MG/ML
12 INJECTION, SOLUTION INTRA-ARTICULAR; INTRALESIONAL; INTRAMUSCULAR; INTRAVENOUS; SOFT TISSUE
Status: COMPLETED | OUTPATIENT
Start: 2022-06-20 | End: 2022-06-20

## 2022-06-20 RX ORDER — CHLORHEXIDINE GLUCONATE ORAL RINSE 1.2 MG/ML
15 SOLUTION DENTAL ONCE
Status: COMPLETED | OUTPATIENT
Start: 2022-06-20 | End: 2022-06-20

## 2022-06-20 RX ADMIN — AMOXICILLIN 1000 MG: 250 CAPSULE ORAL at 01:06

## 2022-06-20 RX ADMIN — CHLORHEXIDINE GLUCONATE 0.12% ORAL RINSE 15 ML: 1.2 LIQUID ORAL at 01:06

## 2022-06-20 RX ADMIN — HYDROCODONE BITARTRATE AND ACETAMINOPHEN 1 TABLET: 5; 325 TABLET ORAL at 01:06

## 2022-06-20 RX ADMIN — DEXAMETHASONE SODIUM PHOSPHATE 12 MG: 4 INJECTION, SOLUTION INTRA-ARTICULAR; INTRALESIONAL; INTRAMUSCULAR; INTRAVENOUS; SOFT TISSUE at 01:06

## 2022-06-20 NOTE — ED PROVIDER NOTES
Encounter Date: 6/19/2022       History     Chief Complaint   Patient presents with    Dental Pain     62y F ambulatory to ED with c/o pain, and swelling x3 hours to right lower jaw. States she cracked a crown yesterday.      Pleasant 62-year-old female presents the ER for evaluation of right lower jaw swelling and pain.  Reports yesterday she cracked her crown., since then progressively worsening pain and swelling on right lower jaw.  No airway compromise.  No fever chills chest pain shortness of breath.  Came the ER for further evaluation.        Review of patient's allergies indicates:   Allergen Reactions    Cymbalta [duloxetine] Nausea Only    Latex, natural rubber Hives and Swelling     Face and throat swelling     Azithromycin Diarrhea    Ceclor [cefaclor] Rash     Past Medical History:   Diagnosis Date    AR (allergic rhinitis)     Benign essential hypertension     Endometrial cancer, grade I 10/22/2018    Fibromyalgia     High cholesterol     Hypothyroidism     Nephrolithiasis 1/5/2016    Type 2 diabetes mellitus      Past Surgical History:   Procedure Laterality Date    APPENDECTOMY  1976    open w cholecystectomy    CHOLECYSTECTOMY  1976    open    COLONOSCOPY  2013    HYSTERECTOMY      OOPHORECTOMY      ROBOT-ASSISTED LAPAROSCOPIC ABDOMINAL HYSTERECTOMY USING DA NADEGE XI N/A 11/30/2018    Procedure: XI ROBOTIC HYSTERECTOMY;  Surgeon: Vivien Kruse MD;  Location: [facility] OR;  Service: OB/GYN;  Laterality: N/A;    ROBOT-ASSISTED LAPAROSCOPIC PELVIC LYMPHADENECTOMY USING DA NADEGE XI Bilateral 11/30/2018    Procedure: XI ROBOTIC LYMPHADENECTOMY, PELVIC;  Surgeon: Vivien Kruse MD;  Location: [facility] OR;  Service: OB/GYN;  Laterality: Bilateral;    ROBOT-ASSISTED LAPAROSCOPIC SALPINGO-OOPHORECTOMY USING DA NADEGE XI Bilateral 11/30/2018    Procedure: XI ROBOTIC SALPINGO-OOPHORECTOMY;  Surgeon: Vivien Kruse MD;  Location: [facility] OR;  Service: OB/GYN;  Laterality: Bilateral;    SINUS  SURGERY  1995    TONSILLECTOMY       Family History   Problem Relation Age of Onset    Diabetes Mother     Diabetes Brother     Hypothyroidism Brother     Diabetes Paternal Grandfather     Hypothyroidism Sister     Hypothyroidism Sister      Social History     Tobacco Use    Smoking status: Never Smoker    Smokeless tobacco: Never Used   Substance Use Topics    Alcohol use: No    Drug use: No     Review of Systems   Constitutional: Negative for fatigue and fever.   HENT:        Tooth pain, jaw swelling   Respiratory: Negative for shortness of breath.    Cardiovascular: Negative for chest pain.   All other systems reviewed and are negative.      Physical Exam     Initial Vitals [06/19/22 2301]   BP Pulse Resp Temp SpO2   (!) 151/80 82 16 99.3 °F (37.4 °C) 96 %      MAP       --         Physical Exam    Nursing note and vitals reviewed.  Constitutional: She appears well-developed and well-nourished.   HENT:   Head: Normocephalic and atraumatic.     Removed crown on tooth number 28, mild erythema, reactive swelling and right jaw submental lymphadenopathy no airway compromise   Eyes: Pupils are equal, round, and reactive to light.   Neck:   Normal range of motion.  Musculoskeletal:      Cervical back: Normal range of motion.     Neurological: She is alert and oriented to person, place, and time. GCS score is 15. GCS eye subscore is 4. GCS verbal subscore is 5. GCS motor subscore is 6.   Skin: Skin is warm and dry. Capillary refill takes less than 2 seconds.   Psychiatric: She has a normal mood and affect. Thought content normal.         ED Course   Procedures  Labs Reviewed - No data to display       Imaging Results    None          Medications   dexamethasone injection 12 mg (12 mg Intramuscular Given 6/20/22 0112)   HYDROcodone-acetaminophen 5-325 mg per tablet 1 tablet (1 tablet Oral Given 6/20/22 0108)   amoxicillin capsule 1,000 mg (1,000 mg Oral Given 6/20/22 0108)   chlorhexidine 0.12 % solution 15 mL  (15 mLs Mouth/Throat Given 6/20/22 0111)     Medical Decision Making:   Initial Assessment:   62-year-old female presents the ER for evaluation of jaw pain swelling.  Cracked crown yesterday.  No airway compromise.  There is reactive swelling.  Some mild erythema around the exposed to.  Patient reports she will be able to see dentist in the morning.  But she want to come to the ER to initiate medications.  Not unreasonable.  Will plan antibiotics, 1 time dose of steroids pain control chlorhexidine reassess likely discharge.  Differential Diagnosis:   Gordo's angina dental abscess cellulitis dentalgia other cause             ED Course as of 06/20/22 0237 Mon Jun 20, 2022   0200 Resting bed no acute distress patient feeling better.  Will discharge with antibiotics, chlorhexidine, short course of pain pills will follow up with dentist in the morning return precautions discussed patient will be discharged. [SE]      ED Course User Index  [SE] Jaylon Stanley MD             Clinical Impression:   Final diagnoses:  [K08.89] Pain, dental (Primary)  [K08.89] Dentalgia          ED Disposition Condition    Discharge Stable        ED Prescriptions     Medication Sig Dispense Start Date End Date Auth. Provider    chlorhexidine (PERIDEX) 0.12 % solution Use as directed 15 mLs in the mouth or throat 2 (two) times daily. for 14 days 473 mL 6/20/2022 7/4/2022 Jaylon Stanley MD    amoxicillin (AMOXIL) 875 MG tablet Take 1 tablet (875 mg total) by mouth 2 (two) times daily. 14 tablet 6/20/2022  Jaylon Stanley MD    HYDROcodone-acetaminophen (NORCO) 5-325 mg per tablet Take 1 tablet by mouth every 6 (six) hours as needed for Pain. 5 tablet 6/20/2022 6/23/2022 Jaylon Stanley MD        Follow-up Information    None          Jaylon Stanley MD  06/20/22 0237

## 2022-06-20 NOTE — DISCHARGE INSTRUCTIONS
Please follow-up with your dentist as discussed.  Please take medications and antibiotics as prescribed return to the ER for any concerning reason.

## 2022-08-16 ENCOUNTER — OFFICE VISIT (OUTPATIENT)
Dept: GYNECOLOGIC ONCOLOGY | Facility: CLINIC | Age: 62
End: 2022-08-16
Payer: COMMERCIAL

## 2022-08-16 VITALS
DIASTOLIC BLOOD PRESSURE: 60 MMHG | SYSTOLIC BLOOD PRESSURE: 132 MMHG | BODY MASS INDEX: 52.59 KG/M2 | HEIGHT: 60 IN | WEIGHT: 267.88 LBS

## 2022-08-16 DIAGNOSIS — C54.1 ENDOMETRIAL CANCER: Primary | ICD-10-CM

## 2022-08-16 PROCEDURE — 99999 PR PBB SHADOW E&M-EST. PATIENT-LVL III: ICD-10-PCS | Mod: PBBFAC,,, | Performed by: OBSTETRICS & GYNECOLOGY

## 2022-08-16 PROCEDURE — 99214 PR OFFICE/OUTPT VISIT, EST, LEVL IV, 30-39 MIN: ICD-10-PCS | Mod: S$GLB,,, | Performed by: OBSTETRICS & GYNECOLOGY

## 2022-08-16 PROCEDURE — 99999 PR PBB SHADOW E&M-EST. PATIENT-LVL III: CPT | Mod: PBBFAC,,, | Performed by: OBSTETRICS & GYNECOLOGY

## 2022-08-16 PROCEDURE — 99214 OFFICE O/P EST MOD 30 MIN: CPT | Mod: S$GLB,,, | Performed by: OBSTETRICS & GYNECOLOGY

## 2022-08-16 NOTE — PROGRESS NOTES
Subjective:       Patient ID: Mela Manriquez is a 62 y.o. female.    Chief Complaint: Follow-up (6 month FU)    HPI  Presents today for surveillance visit for Stage IA, grade 2 endometrioid type endometrial adenocarcinoma. S/p RTLH/BSO/PLND 11/30/18. Uncomplicated post op course.     Doing well with no GYN complaints. Specifically denies N/V, pain, swelling, bleeding, unexpected weight change, SOB, problems with bowel or bladder function.   Disease free interval 3.75 year    MMG and health maintenance via PCP.         History:  Ms. Manriquez is a 58yr old para 2 (vag del x 2) referred from Dr. Call for Gr 1 endometrial cancer found on EMBx. Patient reports LMP at age 48, started bleeding on 10/1/2018 sometimes heavy with clots.     Pelvic US   Uterus 11 x 6 x 6cm with NATALIE 3.3cm  Bilateral ovaries not visualized     EMBx sound to 6cm, Endometrial adenocarcinoma, endometrioid type, FIGO grade 1.     Last pap smear 10/3/2018 Atypical glandular cells, favor a neoplastic process. No prior history for abnormal pap smears.     Surgical history: open appendectomy and cholecystectomy, 1976     Family history negative for breast, ovarian or colon CA. Niece - uterine vs cervical CA (30s), another niece - bone CA (24)    Review of Systems   Constitutional: Negative for chills and fever.   HENT: Negative for mouth sores.    Respiratory: Negative for chest tightness and shortness of breath.    Cardiovascular: Negative for chest pain.   Gastrointestinal: Negative for abdominal distention, nausea and vomiting.   Genitourinary: Negative for dysuria, hematuria, pelvic pain, vaginal bleeding and vaginal discharge.   Neurological: Negative for syncope and weakness.   All other systems reviewed and are negative.        Objective:      Physical Exam  Vitals reviewed. Exam conducted with a chaperone present.   Constitutional:       Appearance: Normal appearance.   HENT:      Head: Normocephalic and atraumatic.   Eyes:      Extraocular  Movements: Extraocular movements intact.      Conjunctiva/sclera: Conjunctivae normal.      Pupils: Pupils are equal, round, and reactive to light.   Pulmonary:      Effort: Pulmonary effort is normal. No respiratory distress.   Abdominal:      General: There is no distension.      Palpations: Abdomen is soft.      Tenderness: There is no abdominal tenderness.   Genitourinary:     Labia:         Right: No lesion.         Left: No lesion.       Vagina: Normal.      Uterus: Absent.       Adnexa:         Right: No mass.          Left: No mass.     Musculoskeletal:         General: Normal range of motion.   Lymphadenopathy:      Lower Body: No right inguinal adenopathy. No left inguinal adenopathy.   Skin:     General: Skin is warm and dry.   Neurological:      General: No focal deficit present.      Mental Status: She is alert and oriented to person, place, and time. Mental status is at baseline.   Psychiatric:         Mood and Affect: Mood normal.         Behavior: Behavior normal.         Thought Content: Thought content normal.         Judgment: Judgment normal.         Assessment:       Problem List Items Addressed This Visit        Oncology    Endometrial cancer - Primary (Chronic)          Plan:       No evidence of disease on today's exam  Feels well, no new symptoms  Disease free interval 3.75 years     Recommend continued surveillance. RTC 6 months or sooner if needed.      I spent approximately 30 minutes reviewing the available records and evaluating the patient, out of which over 50% of the time was spent face to face with the patient in counseling and coordinating this patient's care.

## 2023-02-27 NOTE — PROGRESS NOTES
Subjective:       Patient ID: Mela Manriquez is a 62 y.o. female.    Chief Complaint: Follow-up (6 mth follow up )    HPI  Presents today for surveillance visit for Stage IA, grade 2 endometrioid type endometrial adenocarcinoma. S/p RTLH/BSO/PLND 11/30/18. Uncomplicated post op course.      Doing well with no GYN complaints. Specifically denies N/V, pain, swelling, bleeding, unexpected weight change, SOB, problems with bowel or bladder function.   Disease free interval 4.25 year     MMG and health maintenance via PCP.         History:  Ms. Manriquez is a 58yr old para 2 (vag del x 2) referred from Dr. Call for Gr 1 endometrial cancer found on EMBx. Patient reports LMP at age 48, started bleeding on 10/1/2018 sometimes heavy with clots.     Pelvic US   Uterus 11 x 6 x 6cm with NATALIE 3.3cm  Bilateral ovaries not visualized     EMBx sound to 6cm, Endometrial adenocarcinoma, endometrioid type, FIGO grade 1.     Last pap smear 10/3/2018 Atypical glandular cells, favor a neoplastic process. No prior history for abnormal pap smears.     Surgical history: open appendectomy and cholecystectomy, 1976     Family history negative for breast, ovarian or colon CA. Niece - uterine vs cervical CA (30s), another niece - bone CA (24  Review of Systems   Constitutional:  Negative for chills and fever.   HENT:  Negative for mouth sores.    Respiratory:  Negative for chest tightness and shortness of breath.    Cardiovascular:  Negative for chest pain.   Gastrointestinal:  Negative for abdominal distention, nausea and vomiting.   Genitourinary:  Negative for dysuria, hematuria, pelvic pain, vaginal bleeding and vaginal discharge.   Neurological:  Negative for syncope and weakness.       Objective:      Physical Exam  Vitals reviewed. Exam conducted with a chaperone present.   Constitutional:       Appearance: Normal appearance.   HENT:      Head: Normocephalic and atraumatic.   Eyes:      Extraocular Movements: Extraocular movements  intact.      Conjunctiva/sclera: Conjunctivae normal.      Pupils: Pupils are equal, round, and reactive to light.   Pulmonary:      Effort: Pulmonary effort is normal. No respiratory distress.   Abdominal:      General: There is no distension.      Palpations: Abdomen is soft.      Tenderness: There is no abdominal tenderness.   Genitourinary:     Vagina: Normal.      Uterus: Absent.       Adnexa:         Right: No mass.          Left: No mass.     Musculoskeletal:         General: Normal range of motion.   Skin:     General: Skin is warm and dry.   Neurological:      General: No focal deficit present.      Mental Status: She is alert and oriented to person, place, and time. Mental status is at baseline.   Psychiatric:         Mood and Affect: Mood normal.         Behavior: Behavior normal.         Thought Content: Thought content normal.         Judgment: Judgment normal.       Assessment:       Problem List Items Addressed This Visit          Oncology    Endometrial cancer - Primary (Chronic)       Plan:       No evidence of disease on today's exam  Feels well, no new symptoms  Disease free interval 4.25 years     Recommend continued surveillance. RTC 6 months or sooner if needed.      I spent approximately 30 minutes reviewing the available records and evaluating the patient, out of which over 50% of the time was spent face to face with the patient in counseling and coordinating this patient's care.

## 2023-02-28 ENCOUNTER — OFFICE VISIT (OUTPATIENT)
Dept: GYNECOLOGIC ONCOLOGY | Facility: CLINIC | Age: 63
End: 2023-02-28
Payer: COMMERCIAL

## 2023-02-28 VITALS
BODY MASS INDEX: 51.55 KG/M2 | HEART RATE: 69 BPM | DIASTOLIC BLOOD PRESSURE: 82 MMHG | WEIGHT: 262.56 LBS | SYSTOLIC BLOOD PRESSURE: 141 MMHG | HEIGHT: 60 IN

## 2023-02-28 DIAGNOSIS — C54.1 ENDOMETRIAL CANCER: Primary | ICD-10-CM

## 2023-02-28 PROCEDURE — 99214 PR OFFICE/OUTPT VISIT, EST, LEVL IV, 30-39 MIN: ICD-10-PCS | Mod: S$GLB,,, | Performed by: OBSTETRICS & GYNECOLOGY

## 2023-02-28 PROCEDURE — 99999 PR PBB SHADOW E&M-EST. PATIENT-LVL IV: ICD-10-PCS | Mod: PBBFAC,,, | Performed by: OBSTETRICS & GYNECOLOGY

## 2023-02-28 PROCEDURE — 99214 OFFICE O/P EST MOD 30 MIN: CPT | Mod: S$GLB,,, | Performed by: OBSTETRICS & GYNECOLOGY

## 2023-02-28 PROCEDURE — 99999 PR PBB SHADOW E&M-EST. PATIENT-LVL IV: CPT | Mod: PBBFAC,,, | Performed by: OBSTETRICS & GYNECOLOGY

## 2023-03-27 ENCOUNTER — LAB VISIT (OUTPATIENT)
Dept: LAB | Facility: HOSPITAL | Age: 63
End: 2023-03-27
Attending: FAMILY MEDICINE
Payer: COMMERCIAL

## 2023-03-27 DIAGNOSIS — E11.9 TYPE 2 DIABETES MELLITUS WITHOUT COMPLICATION, WITHOUT LONG-TERM CURRENT USE OF INSULIN: Chronic | ICD-10-CM

## 2023-03-27 LAB
ALBUMIN/CREAT UR: NORMAL UG/MG (ref 0–30)
CREAT UR-MCNC: 36 MG/DL (ref 15–325)
MICROALBUMIN UR DL<=1MG/L-MCNC: <5 UG/ML

## 2023-03-27 PROCEDURE — 82570 ASSAY OF URINE CREATININE: CPT | Performed by: FAMILY MEDICINE

## 2023-08-29 ENCOUNTER — OFFICE VISIT (OUTPATIENT)
Dept: GYNECOLOGIC ONCOLOGY | Facility: CLINIC | Age: 63
End: 2023-08-29
Payer: MEDICARE

## 2023-08-29 VITALS
HEIGHT: 60 IN | HEART RATE: 64 BPM | WEIGHT: 262.38 LBS | SYSTOLIC BLOOD PRESSURE: 138 MMHG | BODY MASS INDEX: 51.51 KG/M2 | DIASTOLIC BLOOD PRESSURE: 70 MMHG

## 2023-08-29 DIAGNOSIS — C54.1 ENDOMETRIAL CANCER: Primary | ICD-10-CM

## 2023-08-29 PROCEDURE — 3044F HG A1C LEVEL LT 7.0%: CPT | Mod: CPTII,S$GLB,, | Performed by: OBSTETRICS & GYNECOLOGY

## 2023-08-29 PROCEDURE — 3008F BODY MASS INDEX DOCD: CPT | Mod: CPTII,S$GLB,, | Performed by: OBSTETRICS & GYNECOLOGY

## 2023-08-29 PROCEDURE — 4010F ACE/ARB THERAPY RXD/TAKEN: CPT | Mod: CPTII,S$GLB,, | Performed by: OBSTETRICS & GYNECOLOGY

## 2023-08-29 PROCEDURE — 3008F PR BODY MASS INDEX (BMI) DOCUMENTED: ICD-10-PCS | Mod: CPTII,S$GLB,, | Performed by: OBSTETRICS & GYNECOLOGY

## 2023-08-29 PROCEDURE — 3288F PR FALLS RISK ASSESSMENT DOCUMENTED: ICD-10-PCS | Mod: CPTII,S$GLB,, | Performed by: OBSTETRICS & GYNECOLOGY

## 2023-08-29 PROCEDURE — 3066F NEPHROPATHY DOC TX: CPT | Mod: CPTII,S$GLB,, | Performed by: OBSTETRICS & GYNECOLOGY

## 2023-08-29 PROCEDURE — 3044F PR MOST RECENT HEMOGLOBIN A1C LEVEL <7.0%: ICD-10-PCS | Mod: CPTII,S$GLB,, | Performed by: OBSTETRICS & GYNECOLOGY

## 2023-08-29 PROCEDURE — 3061F NEG MICROALBUMINURIA REV: CPT | Mod: CPTII,S$GLB,, | Performed by: OBSTETRICS & GYNECOLOGY

## 2023-08-29 PROCEDURE — 1101F PR PT FALLS ASSESS DOC 0-1 FALLS W/OUT INJ PAST YR: ICD-10-PCS | Mod: CPTII,S$GLB,, | Performed by: OBSTETRICS & GYNECOLOGY

## 2023-08-29 PROCEDURE — 3078F PR MOST RECENT DIASTOLIC BLOOD PRESSURE < 80 MM HG: ICD-10-PCS | Mod: CPTII,S$GLB,, | Performed by: OBSTETRICS & GYNECOLOGY

## 2023-08-29 PROCEDURE — 99214 PR OFFICE/OUTPT VISIT, EST, LEVL IV, 30-39 MIN: ICD-10-PCS | Mod: S$GLB,,, | Performed by: OBSTETRICS & GYNECOLOGY

## 2023-08-29 PROCEDURE — 3061F PR NEG MICROALBUMINURIA RESULT DOCUMENTED/REVIEW: ICD-10-PCS | Mod: CPTII,S$GLB,, | Performed by: OBSTETRICS & GYNECOLOGY

## 2023-08-29 PROCEDURE — 3078F DIAST BP <80 MM HG: CPT | Mod: CPTII,S$GLB,, | Performed by: OBSTETRICS & GYNECOLOGY

## 2023-08-29 PROCEDURE — 4010F PR ACE/ARB THEARPY RXD/TAKEN: ICD-10-PCS | Mod: CPTII,S$GLB,, | Performed by: OBSTETRICS & GYNECOLOGY

## 2023-08-29 PROCEDURE — 3075F PR MOST RECENT SYSTOLIC BLOOD PRESS GE 130-139MM HG: ICD-10-PCS | Mod: CPTII,S$GLB,, | Performed by: OBSTETRICS & GYNECOLOGY

## 2023-08-29 PROCEDURE — 1159F MED LIST DOCD IN RCRD: CPT | Mod: CPTII,S$GLB,, | Performed by: OBSTETRICS & GYNECOLOGY

## 2023-08-29 PROCEDURE — 3288F FALL RISK ASSESSMENT DOCD: CPT | Mod: CPTII,S$GLB,, | Performed by: OBSTETRICS & GYNECOLOGY

## 2023-08-29 PROCEDURE — 3075F SYST BP GE 130 - 139MM HG: CPT | Mod: CPTII,S$GLB,, | Performed by: OBSTETRICS & GYNECOLOGY

## 2023-08-29 PROCEDURE — 1101F PT FALLS ASSESS-DOCD LE1/YR: CPT | Mod: CPTII,S$GLB,, | Performed by: OBSTETRICS & GYNECOLOGY

## 2023-08-29 PROCEDURE — 3066F PR DOCUMENTATION OF TREATMENT FOR NEPHROPATHY: ICD-10-PCS | Mod: CPTII,S$GLB,, | Performed by: OBSTETRICS & GYNECOLOGY

## 2023-08-29 PROCEDURE — 99999 PR PBB SHADOW E&M-EST. PATIENT-LVL IV: ICD-10-PCS | Mod: PBBFAC,,, | Performed by: OBSTETRICS & GYNECOLOGY

## 2023-08-29 PROCEDURE — 99214 OFFICE O/P EST MOD 30 MIN: CPT | Mod: S$GLB,,, | Performed by: OBSTETRICS & GYNECOLOGY

## 2023-08-29 PROCEDURE — 99999 PR PBB SHADOW E&M-EST. PATIENT-LVL IV: CPT | Mod: PBBFAC,,, | Performed by: OBSTETRICS & GYNECOLOGY

## 2023-08-29 PROCEDURE — 1159F PR MEDICATION LIST DOCUMENTED IN MEDICAL RECORD: ICD-10-PCS | Mod: CPTII,S$GLB,, | Performed by: OBSTETRICS & GYNECOLOGY

## 2023-08-29 NOTE — PROGRESS NOTES
Subjective:      Patient ID: Mela Manriquez is a 63 y.o. female.    Chief Complaint: Follow-up (6 mth follow up )      HPI  Presents today for surveillance visit for Stage IA, grade 2 endometrioid type endometrial adenocarcinoma. S/p RTLH/BSO/PLND 11/30/18. Uncomplicated post op course.   Low risk, no adjuvant treatment.      Doing well with no GYN complaints.   Specifically denies N/V, pain, swelling, bleeding, unexpected weight change, SOB, problems with bowel or bladder function.      MMG and health maintenance via PCP.      ________________________________   History:  Ms. Manriquez is a 58yr old para 2 (vag del x 2) referred from Dr. Call for Gr 1 endometrial cancer found on EMBx. Patient reports LMP at age 48, started bleeding on 10/1/2018 sometimes heavy with clots.     Pelvic US   Uterus 11 x 6 x 6cm with NATALIE 3.3cm  Bilateral ovaries not visualized     EMBx sound to 6cm, Endometrial adenocarcinoma, endometrioid type, FIGO grade 1.     Last pap smear 10/3/2018 Atypical glandular cells, favor a neoplastic process. No prior history for abnormal pap smears.     Surgical history: open appendectomy and cholecystectomy, 1976     Family history negative for breast, ovarian or colon CA. Niece - uterine vs cervical CA (30s), another niece - bone CA (24  Review of Systems   Constitutional:  Negative for appetite change, chills, fatigue and fever.   HENT:  Negative for mouth sores.    Respiratory:  Negative for cough and shortness of breath.    Cardiovascular:  Negative for leg swelling.   Gastrointestinal:  Negative for abdominal pain, blood in stool, constipation and diarrhea.   Endocrine: Negative for cold intolerance.   Genitourinary:  Negative for dysuria and vaginal bleeding.   Musculoskeletal:  Negative for myalgias.   Skin:  Negative for rash.   Allergic/Immunologic: Negative.    Neurological:  Negative for weakness and numbness.   Hematological:  Negative for adenopathy. Does not bruise/bleed easily.    Psychiatric/Behavioral:  Negative for confusion.        Objective:   Physical Exam:   Constitutional: She is oriented to person, place, and time. She appears well-developed and well-nourished.    HENT:   Head: Normocephalic and atraumatic.    Eyes: Pupils are equal, round, and reactive to light. EOM are normal.    Neck: No thyromegaly present.    Cardiovascular:  Normal rate, regular rhythm and intact distal pulses.             Pulmonary/Chest: Effort normal and breath sounds normal. No respiratory distress. She has no wheezes.        Abdominal: Soft. Bowel sounds are normal. She exhibits no distension and no mass. There is no abdominal tenderness.     Genitourinary:    Vagina and rectum normal.      Pelvic exam was performed with patient supine.   There is no lesion on the right labia. There is no lesion on the left labia. Right adnexum displays no mass. Left adnexum displays no mass. Vaginal cuff normal.  Cervix is absent.Uterus is absent.           Musculoskeletal: Normal range of motion and moves all extremeties.      Lymphadenopathy:     She has no cervical adenopathy. No inguinal adenopathy noted on the right or left side.        Right: No supraclavicular adenopathy present.        Left: No supraclavicular adenopathy present.    Neurological: She is alert and oriented to person, place, and time.    Skin: Skin is warm and dry. No rash noted.    Psychiatric: She has a normal mood and affect.       Assessment:     1. Endometrial cancer        Plan:   No orders of the defined types were placed in this encounter.    No evidence of disease on today's exam  Feels well, no new symptoms  She is released from oncologic surveillance and will continue annual surveillance with well woman care.        I spent approximately 30 minutes reviewing the available records and evaluating the patient, out of which over 50% of the time was spent face to face with the patient in counseling and coordinating this patient's care.

## 2023-08-29 NOTE — Clinical Note
She's done well since 2018. Low risk endometrial cancer and did not require any adjuvant treatment following hyst. She is released from oncology surveillance. Hoping she can return to annual care with you.

## 2023-09-14 ENCOUNTER — LAB VISIT (OUTPATIENT)
Dept: LAB | Facility: HOSPITAL | Age: 63
End: 2023-09-14
Attending: FAMILY MEDICINE
Payer: MEDICARE

## 2023-09-14 DIAGNOSIS — I10 ESSENTIAL HYPERTENSION, BENIGN: Chronic | ICD-10-CM

## 2023-09-14 DIAGNOSIS — E11.9 TYPE 2 DIABETES MELLITUS WITHOUT COMPLICATION, WITHOUT LONG-TERM CURRENT USE OF INSULIN: Chronic | ICD-10-CM

## 2023-09-14 LAB
ALBUMIN SERPL BCP-MCNC: 4.2 G/DL (ref 3.5–5.2)
ALP SERPL-CCNC: 78 U/L (ref 55–135)
ALT SERPL W/O P-5'-P-CCNC: 19 U/L (ref 10–44)
ANION GAP SERPL CALC-SCNC: 11 MMOL/L (ref 8–16)
AST SERPL-CCNC: 23 U/L (ref 10–40)
BASOPHILS # BLD AUTO: 0.06 K/UL (ref 0–0.2)
BASOPHILS NFR BLD: 0.8 % (ref 0–1.9)
BILIRUB SERPL-MCNC: 0.7 MG/DL (ref 0.1–1)
BUN SERPL-MCNC: 21 MG/DL (ref 8–23)
CALCIUM SERPL-MCNC: 10 MG/DL (ref 8.7–10.5)
CHLORIDE SERPL-SCNC: 107 MMOL/L (ref 95–110)
CO2 SERPL-SCNC: 24 MMOL/L (ref 23–29)
CREAT SERPL-MCNC: 1.2 MG/DL (ref 0.5–1.4)
DIFFERENTIAL METHOD: NORMAL
EOSINOPHIL # BLD AUTO: 0.2 K/UL (ref 0–0.5)
EOSINOPHIL NFR BLD: 2.3 % (ref 0–8)
ERYTHROCYTE [DISTWIDTH] IN BLOOD BY AUTOMATED COUNT: 13.2 % (ref 11.5–14.5)
EST. GFR  (NO RACE VARIABLE): 50 ML/MIN/1.73 M^2
ESTIMATED AVG GLUCOSE: 105 MG/DL (ref 68–131)
GLUCOSE SERPL-MCNC: 100 MG/DL (ref 70–110)
HBA1C MFR BLD: 5.3 % (ref 4–5.6)
HCT VFR BLD AUTO: 42.2 % (ref 37–48.5)
HGB BLD-MCNC: 14.1 G/DL (ref 12–16)
IMM GRANULOCYTES # BLD AUTO: 0.02 K/UL (ref 0–0.04)
IMM GRANULOCYTES NFR BLD AUTO: 0.3 % (ref 0–0.5)
INSULIN COLLECTION INTERVAL: NORMAL
INSULIN SERPL-ACNC: 13.7 UU/ML
LYMPHOCYTES # BLD AUTO: 1.6 K/UL (ref 1–4.8)
LYMPHOCYTES NFR BLD: 21.1 % (ref 18–48)
MCH RBC QN AUTO: 30.4 PG (ref 27–31)
MCHC RBC AUTO-ENTMCNC: 33.4 G/DL (ref 32–36)
MCV RBC AUTO: 91 FL (ref 82–98)
MONOCYTES # BLD AUTO: 0.5 K/UL (ref 0.3–1)
MONOCYTES NFR BLD: 6.1 % (ref 4–15)
NEUTROPHILS # BLD AUTO: 5.4 K/UL (ref 1.8–7.7)
NEUTROPHILS NFR BLD: 69.4 % (ref 38–73)
NRBC BLD-RTO: 0 /100 WBC
PLATELET # BLD AUTO: 262 K/UL (ref 150–450)
PMV BLD AUTO: 10.5 FL (ref 9.2–12.9)
POTASSIUM SERPL-SCNC: 4.3 MMOL/L (ref 3.5–5.1)
PROT SERPL-MCNC: 7.3 G/DL (ref 6–8.4)
RBC # BLD AUTO: 4.64 M/UL (ref 4–5.4)
SODIUM SERPL-SCNC: 142 MMOL/L (ref 136–145)
WBC # BLD AUTO: 7.72 K/UL (ref 3.9–12.7)

## 2023-09-14 PROCEDURE — 83036 HEMOGLOBIN GLYCOSYLATED A1C: CPT | Performed by: FAMILY MEDICINE

## 2023-09-14 PROCEDURE — 80053 COMPREHEN METABOLIC PANEL: CPT | Performed by: FAMILY MEDICINE

## 2023-09-14 PROCEDURE — 83525 ASSAY OF INSULIN: CPT | Performed by: FAMILY MEDICINE

## 2023-09-14 PROCEDURE — 85025 COMPLETE CBC W/AUTO DIFF WBC: CPT | Performed by: FAMILY MEDICINE

## 2024-03-14 PROBLEM — E78.00 HIGH CHOLESTEROL: Status: ACTIVE | Noted: 2024-03-14

## 2024-03-14 PROBLEM — M54.32 SCIATICA OF LEFT SIDE: Status: ACTIVE | Noted: 2024-03-14

## 2024-07-02 ENCOUNTER — HOSPITAL ENCOUNTER (OUTPATIENT)
Dept: RADIOLOGY | Facility: HOSPITAL | Age: 64
Discharge: HOME | End: 2024-07-02
Attending: FAMILY MEDICINE
Payer: MEDICARE

## 2024-07-02 DIAGNOSIS — Z12.31 ENCOUNTER FOR SCREENING MAMMOGRAM FOR BREAST CANCER: ICD-10-CM

## 2024-07-02 PROCEDURE — 77063 BREAST TOMOSYNTHESIS BI: CPT | Mod: TC

## 2024-07-02 PROCEDURE — 77067 SCR MAMMO BI INCL CAD: CPT | Mod: TC

## 2024-07-22 DIAGNOSIS — S82.65XD CLOSED NONDISPLACED FRACTURE OF LATERAL MALLEOLUS OF LEFT FIBULA WITH ROUTINE HEALING, SUBSEQUENT ENCOUNTER: Primary | ICD-10-CM

## 2024-07-23 ENCOUNTER — HOSPITAL ENCOUNTER (OUTPATIENT)
Dept: RADIOLOGY | Facility: HOSPITAL | Age: 64
Discharge: HOME | End: 2024-07-23
Attending: SURGERY
Payer: MEDICARE

## 2024-07-23 ENCOUNTER — OFFICE VISIT (OUTPATIENT)
Dept: ORTHOPEDICS | Facility: CLINIC | Age: 64
End: 2024-07-23
Payer: MEDICARE

## 2024-07-23 VITALS — WEIGHT: 266.13 LBS | HEIGHT: 60 IN | BODY MASS INDEX: 52.25 KG/M2

## 2024-07-23 DIAGNOSIS — S82.65XD CLOSED NONDISPLACED FRACTURE OF LATERAL MALLEOLUS OF LEFT FIBULA WITH ROUTINE HEALING, SUBSEQUENT ENCOUNTER: ICD-10-CM

## 2024-07-23 DIAGNOSIS — S82.65XD CLOSED NONDISPLACED FRACTURE OF LATERAL MALLEOLUS OF LEFT FIBULA WITH ROUTINE HEALING, SUBSEQUENT ENCOUNTER: Primary | ICD-10-CM

## 2024-07-23 PROCEDURE — 99204 OFFICE O/P NEW MOD 45 MIN: CPT | Mod: 57,S$GLB,, | Performed by: SURGERY

## 2024-07-23 PROCEDURE — 3066F NEPHROPATHY DOC TX: CPT | Mod: CPTII,S$GLB,, | Performed by: SURGERY

## 2024-07-23 PROCEDURE — 1159F MED LIST DOCD IN RCRD: CPT | Mod: CPTII,S$GLB,, | Performed by: SURGERY

## 2024-07-23 PROCEDURE — 3061F NEG MICROALBUMINURIA REV: CPT | Mod: CPTII,S$GLB,, | Performed by: SURGERY

## 2024-07-23 PROCEDURE — 4010F ACE/ARB THERAPY RXD/TAKEN: CPT | Mod: CPTII,S$GLB,, | Performed by: SURGERY

## 2024-07-23 PROCEDURE — 73590 X-RAY EXAM OF LOWER LEG: CPT | Mod: 26,LT,, | Performed by: STUDENT IN AN ORGANIZED HEALTH CARE EDUCATION/TRAINING PROGRAM

## 2024-07-23 PROCEDURE — 3288F FALL RISK ASSESSMENT DOCD: CPT | Mod: CPTII,S$GLB,, | Performed by: SURGERY

## 2024-07-23 PROCEDURE — 3008F BODY MASS INDEX DOCD: CPT | Mod: CPTII,S$GLB,, | Performed by: SURGERY

## 2024-07-23 PROCEDURE — 99999 PR PBB SHADOW E&M-EST. PATIENT-LVL III: CPT | Mod: PBBFAC,,, | Performed by: SURGERY

## 2024-07-23 PROCEDURE — 1125F AMNT PAIN NOTED PAIN PRSNT: CPT | Mod: CPTII,S$GLB,, | Performed by: SURGERY

## 2024-07-23 PROCEDURE — 27786 TREATMENT OF ANKLE FRACTURE: CPT | Mod: LT,S$GLB,, | Performed by: SURGERY

## 2024-07-23 PROCEDURE — 1101F PT FALLS ASSESS-DOCD LE1/YR: CPT | Mod: CPTII,S$GLB,, | Performed by: SURGERY

## 2024-07-23 PROCEDURE — 73590 X-RAY EXAM OF LOWER LEG: CPT | Mod: TC,FY,LT

## 2024-07-23 PROCEDURE — 3044F HG A1C LEVEL LT 7.0%: CPT | Mod: CPTII,S$GLB,, | Performed by: SURGERY

## 2024-07-24 NOTE — PROGRESS NOTES
Subjective:   Chief complaint:   Chief Complaint   Patient presents with    Left Foot - Injury       Date of injury: 7/23/24    HPI:   Mela Manrqiuez is a 64 y.o. female who presents today for evaluation of left nondisplaced fibula fracture.  Rates pain as mild.  Pain has been ongoing for since date of injury.  Inciting event: injury.  Treatments thus far: weight bearing restrictions, immobilization.    Presents for further management    ROS:  See problem list; Nothing else of note on 12 point system review     Past Medical History:   Diagnosis Date    AR (allergic rhinitis)     Benign essential hypertension     Endometrial cancer, grade I 10/22/2018    Fibromyalgia     High cholesterol     Hypothyroidism     Nephrolithiasis 1/5/2016    Type 2 diabetes mellitus        Past Surgical History:   Procedure Laterality Date    APPENDECTOMY  1976    open w cholecystectomy    CHOLECYSTECTOMY  1976    open    COLONOSCOPY  2013    HYSTERECTOMY      OOPHORECTOMY      ROBOT-ASSISTED LAPAROSCOPIC ABDOMINAL HYSTERECTOMY USING DA NADEGE XI N/A 11/30/2018    Procedure: XI ROBOTIC HYSTERECTOMY;  Surgeon: Vivien Kruse MD;  Location: [facility];  Service: OB/GYN;  Laterality: N/A;    ROBOT-ASSISTED LAPAROSCOPIC PELVIC LYMPHADENECTOMY USING DA NADEGE XI Bilateral 11/30/2018    Procedure: XI ROBOTIC LYMPHADENECTOMY, PELVIC;  Surgeon: Vivien Kruse MD;  Location: [facility] OR;  Service: OB/GYN;  Laterality: Bilateral;    ROBOT-ASSISTED LAPAROSCOPIC SALPINGO-OOPHORECTOMY USING DA NADEGE XI Bilateral 11/30/2018    Procedure: XI ROBOTIC SALPINGO-OOPHORECTOMY;  Surgeon: Vivien Kruse MD;  Location: [facility];  Service: OB/GYN;  Laterality: Bilateral;    SINUS SURGERY  1995    TONSILLECTOMY         Family History   Problem Relation Name Age of Onset    Diabetes Mother      Diabetes Brother      Hypothyroidism Brother      Diabetes Paternal Grandfather      Hypothyroidism Sister      Hypothyroidism Sister         Social History     Socioeconomic  History    Marital status:    Tobacco Use    Smoking status: Never    Smokeless tobacco: Never   Substance and Sexual Activity    Alcohol use: No    Drug use: No    Sexual activity: Yes     Partners: Male     Social Determinants of Health     Stress: No Stress Concern Present (10/16/2019)    [employer] - Occupational Stress Questionnaire     Feeling of Stress : Not at all        Objective:   Exam:  There were no vitals filed for this visit.  General: No acute distress, well-appearing  Neurologic: Alert and oriented x3  Psychiatric: Appropriate mood and affect, cooperative  Cardiovascular: Regular pulse  Respiratory: Breathing on room air  Skin: No rashes or ulcers  Vascular: palpable pulse  Musculoskeletal: left fibula with minimal tenderness to palpation, ligamentously stable, SILT L1-S2, [place] sp/dp/sa/perez    Imaging:  Radiographs were ordered and independently interpreted by me.    Stable crespo a fracture of the distal fibula    Additional records/labs reviewed:  As above      Assessment:     1. Closed nondisplaced fracture of lateral malleolus of left fibula with routine healing, subsequent encounter          Plan:       Orders Placed This Encounter   Procedures    Ambulatory referral/consult to Physical/Occupational Therapy     Standing Status:   Future     Standing Expiration Date:   8/23/2025     Referral Priority:   Routine     Referral Type:   Physical Medicine     Referral Reason:   Specialty Services Required     Requested Specialty:   Physical Therapy     Number of Visits Requested:   1     -We recommend Calcium and vitamin D  -initiating fracture care for this injury  -weight bear as tolerated in a boot  -follow up in 6 weeks with x-rays of the ankle, left standing weight bearing  -plan for non-operative management at this time    Tiburcio Stone MD  [facility]  Orthopedic Surgery

## 2024-08-28 DIAGNOSIS — S82.65XD CLOSED NONDISPLACED FRACTURE OF LATERAL MALLEOLUS OF LEFT FIBULA WITH ROUTINE HEALING, SUBSEQUENT ENCOUNTER: ICD-10-CM

## 2024-08-29 ENCOUNTER — OFFICE VISIT (OUTPATIENT)
Dept: ORTHOPEDICS | Facility: CLINIC | Age: 64
End: 2024-08-29
Payer: MEDICARE

## 2024-08-29 VITALS — BODY MASS INDEX: 52.25 KG/M2 | WEIGHT: 266.13 LBS | HEIGHT: 60 IN

## 2024-08-29 DIAGNOSIS — S82.65XD CLOSED NONDISPLACED FRACTURE OF LATERAL MALLEOLUS OF LEFT FIBULA WITH ROUTINE HEALING, SUBSEQUENT ENCOUNTER: Primary | ICD-10-CM

## 2024-08-29 PROCEDURE — 99024 POSTOP FOLLOW-UP VISIT: CPT | Mod: S$GLB,,, | Performed by: SURGERY

## 2024-08-29 PROCEDURE — 99999 PR PBB SHADOW E&M-EST. PATIENT-LVL III: CPT | Mod: PBBFAC,,, | Performed by: SURGERY

## 2024-08-29 PROCEDURE — 3066F NEPHROPATHY DOC TX: CPT | Mod: CPTII,S$GLB,, | Performed by: SURGERY

## 2024-08-29 PROCEDURE — 3288F FALL RISK ASSESSMENT DOCD: CPT | Mod: CPTII,S$GLB,, | Performed by: SURGERY

## 2024-08-29 PROCEDURE — 4010F ACE/ARB THERAPY RXD/TAKEN: CPT | Mod: CPTII,S$GLB,, | Performed by: SURGERY

## 2024-08-29 PROCEDURE — 3061F NEG MICROALBUMINURIA REV: CPT | Mod: CPTII,S$GLB,, | Performed by: SURGERY

## 2024-08-29 PROCEDURE — 1126F AMNT PAIN NOTED NONE PRSNT: CPT | Mod: CPTII,S$GLB,, | Performed by: SURGERY

## 2024-08-29 PROCEDURE — 3044F HG A1C LEVEL LT 7.0%: CPT | Mod: CPTII,S$GLB,, | Performed by: SURGERY

## 2024-08-29 PROCEDURE — 1159F MED LIST DOCD IN RCRD: CPT | Mod: CPTII,S$GLB,, | Performed by: SURGERY

## 2024-08-29 PROCEDURE — 1101F PT FALLS ASSESS-DOCD LE1/YR: CPT | Mod: CPTII,S$GLB,, | Performed by: SURGERY

## 2024-08-31 NOTE — PROGRESS NOTES
Subjective:   Chief complaint:   Chief Complaint   Patient presents with    Left Ankle - Pain       Date of injury: 7/23/24    HPI:   Mela Manriquez is a 64 y.o. female who presents today for evaluation of left nondisplaced fibula fracture.  Rates pain as mild.  Pain has been ongoing for since date of injury.  Inciting event: injury.  Treatments thus far: weight bearing restrictions, immobilization.    8/29/24 - Presents for further management. Doing well.      ROS:  See problem list; Nothing else of note on 12 point system review     Past Medical History:   Diagnosis Date    AR (allergic rhinitis)     Benign essential hypertension     Endometrial cancer, grade I 10/22/2018    Fibromyalgia     High cholesterol     Hypothyroidism     Nephrolithiasis 1/5/2016    Type 2 diabetes mellitus        Past Surgical History:   Procedure Laterality Date    APPENDECTOMY  1976    open w cholecystectomy    CHOLECYSTECTOMY  1976    open    COLONOSCOPY  2013    HYSTERECTOMY      OOPHORECTOMY      ROBOT-ASSISTED LAPAROSCOPIC ABDOMINAL HYSTERECTOMY USING DA NADEGE XI N/A 11/30/2018    Procedure: XI ROBOTIC HYSTERECTOMY;  Surgeon: Vivien Kruse MD;  Location: [facility] OR;  Service: OB/GYN;  Laterality: N/A;    ROBOT-ASSISTED LAPAROSCOPIC PELVIC LYMPHADENECTOMY USING DA NADEGE XI Bilateral 11/30/2018    Procedure: XI ROBOTIC LYMPHADENECTOMY, PELVIC;  Surgeon: Vivien Kruse MD;  Location: [facility] OR;  Service: OB/GYN;  Laterality: Bilateral;    ROBOT-ASSISTED LAPAROSCOPIC SALPINGO-OOPHORECTOMY USING DA NADEGE XI Bilateral 11/30/2018    Procedure: XI ROBOTIC SALPINGO-OOPHORECTOMY;  Surgeon: Vivien Kruse MD;  Location: [facility] OR;  Service: OB/GYN;  Laterality: Bilateral;    SINUS SURGERY  1995    TONSILLECTOMY         Family History   Problem Relation Name Age of Onset    Diabetes Mother      Diabetes Brother      Hypothyroidism Brother      Diabetes Paternal Grandfather      Hypothyroidism Sister      Hypothyroidism Sister         Social  History     Socioeconomic History    Marital status:    Tobacco Use    Smoking status: Never    Smokeless tobacco: Never   Substance and Sexual Activity    Alcohol use: No    Drug use: No    Sexual activity: Yes     Partners: Male     Social Determinants of Health     Stress: No Stress Concern Present (10/16/2019)    [employer] - Occupational Stress Questionnaire     Feeling of Stress : Not at all        Objective:   Exam:  There were no vitals filed for this visit.  General: No acute distress, well-appearing  Neurologic: Alert and oriented x3  Psychiatric: Appropriate mood and affect, cooperative  Cardiovascular: Regular pulse  Respiratory: Breathing on room air  Skin: No rashes or ulcers  Vascular: palpable pulse  Musculoskeletal: left fibula with minimal tenderness to palpation, ligamentously stable, SILT L1-S2, [place] sp/dp/sa/perez    Imaging:  Radiographs were ordered and independently interpreted by me.    Stable crespo a fracture of the distal fibula  8/29/24- fracture remains stable, healing appropriate.    Additional records/labs reviewed:  As above      Assessment:     No diagnosis found.        Plan:       No orders of the defined types were placed in this encounter.    -continue weightbearing as tolerated.  -follow up as needed vs 6 weeks for further management  -continue normal shoe, no need to boot immobilization or any immobilization adjuncts.    Tiburcio Stone MD  [facility]  Orthopedic Surgery

## 2024-11-14 ENCOUNTER — HOSPITAL ENCOUNTER (OUTPATIENT)
Dept: RADIOLOGY | Facility: HOSPITAL | Age: 64
Discharge: HOME | End: 2024-11-14
Attending: FAMILY MEDICINE
Payer: MEDICARE

## 2024-11-14 ENCOUNTER — HOSPITAL ENCOUNTER (OUTPATIENT)
Dept: CARDIOLOGY | Facility: HOSPITAL | Age: 64
Discharge: HOME | End: 2024-11-14
Attending: FAMILY MEDICINE
Payer: MEDICARE

## 2024-11-14 VITALS — BODY MASS INDEX: 50.26 KG/M2 | HEIGHT: 60 IN | WEIGHT: 256 LBS

## 2024-11-14 DIAGNOSIS — S82.892D CLOSED FRACTURE OF LEFT ANKLE WITH ROUTINE HEALING, SUBSEQUENT ENCOUNTER: ICD-10-CM

## 2024-11-14 DIAGNOSIS — I35.0 AORTIC VALVE STENOSIS, ETIOLOGY OF CARDIAC VALVE DISEASE UNSPECIFIED: ICD-10-CM

## 2024-11-14 LAB
APICAL FOUR CHAMBER EJECTION FRACTION: 67 %
APICAL TWO CHAMBER EJECTION FRACTION: 73 %
ASCENDING AORTA: 3.27 CM
AV INDEX (PROSTH): 0.31
AV MEAN GRADIENT: 35.1 MMHG
AV PEAK GRADIENT: 57.8 MMHG
AV REGURGITATION PRESSURE HALF TIME: 824.01 MS
AV VALVE AREA BY VELOCITY RATIO: 0.9 CM²
AV VALVE AREA: 0.9 CM²
AV VELOCITY RATIO: 0.32
BSA FOR ECHO PROCEDURE: 2.22 M2
CV ECHO LV RWT: 0.42 CM
DOP CALC AO PEAK VEL: 3.8 M/S
DOP CALC AO VTI: 97.8 CM
DOP CALC LVOT AREA: 2.8 CM2
DOP CALC LVOT DIAMETER: 1.9 CM
DOP CALC LVOT PEAK VEL: 1.2 M/S
DOP CALC LVOT STROKE VOLUME: 85 CM3
DOP CALC MV VTI: 28.4 CM
DOP CALCLVOT PEAK VEL VTI: 30 CM
E WAVE DECELERATION TIME: 244.48 MSEC
E/A RATIO: 1.03
E/E' RATIO: 17.09 M/S
ECHO LV POSTERIOR WALL: 0.9 CM (ref 0.6–1.1)
FRACTIONAL SHORTENING: 39.5 % (ref 28–44)
INTERVENTRICULAR SEPTUM: 1.1 CM (ref 0.6–1.1)
IVC DIAMETER: 1.45 CM
LEFT ATRIUM AREA SYSTOLIC (APICAL 2 CHAMBER): 15.94 CM2
LEFT ATRIUM AREA SYSTOLIC (APICAL 4 CHAMBER): 17.19 CM2
LEFT ATRIUM VOLUME INDEX MOD: 22.9 ML/M2
LEFT ATRIUM VOLUME MOD: 47.35 ML
LEFT INTERNAL DIMENSION IN SYSTOLE: 2.6 CM (ref 2.1–4)
LEFT VENTRICLE DIASTOLIC VOLUME INDEX: 40.85 ML/M2
LEFT VENTRICLE DIASTOLIC VOLUME: 84.56 ML
LEFT VENTRICLE END DIASTOLIC VOLUME APICAL 2 CHAMBER: 72.01 ML
LEFT VENTRICLE END DIASTOLIC VOLUME APICAL 4 CHAMBER: 71.48 ML
LEFT VENTRICLE END SYSTOLIC VOLUME APICAL 2 CHAMBER: 41.9 ML
LEFT VENTRICLE END SYSTOLIC VOLUME APICAL 4 CHAMBER: 51.01 ML
LEFT VENTRICLE MASS INDEX: 68.8 G/M2
LEFT VENTRICLE SYSTOLIC VOLUME INDEX: 11.4 ML/M2
LEFT VENTRICLE SYSTOLIC VOLUME: 23.7 ML
LEFT VENTRICULAR INTERNAL DIMENSION IN DIASTOLE: 4.3 CM (ref 3.5–6)
LEFT VENTRICULAR MASS: 142.5 G
LV LATERAL E/E' RATIO: 18.8 M/S
LV SEPTAL E/E' RATIO: 15.67 M/S
LVED V (TEICH): 84.56 ML
LVES V (TEICH): 23.7 ML
LVOT MG: 3.45 MMHG
LVOT MV: 0.88 CM/S
MV A" WAVE DURATION": 139.87 MSEC
MV MEAN GRADIENT: 2 MMHG
MV PEAK A VEL: 0.91 M/S
MV PEAK E VEL: 0.94 M/S
MV PEAK GRADIENT: 4 MMHG
MV STENOSIS PRESSURE HALF TIME: 70.9 MS
MV VALVE AREA BY CONTINUITY EQUATION: 2.99 CM2
MV VALVE AREA P 1/2 METHOD: 3.1 CM2
OHS CV RV/LV RATIO: 0.88 CM
OHS LV EJECTION FRACTION SIMPSONS BIPLANE MOD: 70 %
PISA AR MAX VEL: 3.83 M/S
PISA TR MAX VEL: 2.33 M/S
PULM VEIN S/D RATIO: 1.13
PV MV: 0.94 M/S
PV PEAK D VEL: 0.56 M/S
PV PEAK GRADIENT: 9 MMHG
PV PEAK S VEL: 0.63 M/S
PV PEAK VELOCITY: 1.46 M/S
RA PRESSURE ESTIMATED: 3 MMHG
RA VOL SYS: 27.74 ML
RIGHT ATRIAL AREA: 11.3 CM2
RIGHT ATRIUM VOLUME AREA LENGTH APICAL 4 CHAMBER: 25.95 ML
RIGHT VENTRICLE DIASTOLIC BASEL DIMENSION: 3.8 CM
RV TB RVSP: 5 MMHG
RV TISSUE DOPPLER FREE WALL SYSTOLIC VELOCITY 1 (APICAL 4 CHAMBER VIEW): 14.26 CM/S
SINUS: 2.94 CM
STJ: 2.67 CM
TDI LATERAL: 0.05 M/S
TDI SEPTAL: 0.06 M/S
TDI: 0.06 M/S
TR MAX PG: 22 MMHG
TRICUSPID ANNULAR PLANE SYSTOLIC EXCURSION: 2.08 CM
TV REST PULMONARY ARTERY PRESSURE: 25 MMHG
Z-SCORE OF LEFT VENTRICULAR DIMENSION IN END DIASTOLE: -3.81
Z-SCORE OF LEFT VENTRICULAR DIMENSION IN END SYSTOLE: -3.11

## 2024-11-14 PROCEDURE — 93306 TTE W/DOPPLER COMPLETE: CPT

## 2024-11-14 PROCEDURE — 77080 DXA BONE DENSITY AXIAL: CPT | Mod: 26,,, | Performed by: RADIOLOGY

## 2024-11-14 PROCEDURE — 77080 DXA BONE DENSITY AXIAL: CPT | Mod: TC

## 2024-11-14 PROCEDURE — 93306 TTE W/DOPPLER COMPLETE: CPT | Mod: 26,,, | Performed by: INTERNAL MEDICINE

## 2024-12-11 ENCOUNTER — OFFICE VISIT (OUTPATIENT)
Dept: CARDIOLOGY | Facility: CLINIC | Age: 64
End: 2024-12-11
Payer: MEDICARE

## 2024-12-11 VITALS
HEIGHT: 60 IN | SYSTOLIC BLOOD PRESSURE: 96 MMHG | WEIGHT: 249.44 LBS | HEART RATE: 71 BPM | BODY MASS INDEX: 48.97 KG/M2 | OXYGEN SATURATION: 100 % | DIASTOLIC BLOOD PRESSURE: 69 MMHG

## 2024-12-11 DIAGNOSIS — E78.5 HYPERLIPIDEMIA, UNSPECIFIED HYPERLIPIDEMIA TYPE: ICD-10-CM

## 2024-12-11 DIAGNOSIS — I10 HYPERTENSION, UNSPECIFIED TYPE: Primary | ICD-10-CM

## 2024-12-11 DIAGNOSIS — R73.03 PREDIABETES: ICD-10-CM

## 2024-12-11 DIAGNOSIS — I35.0 AORTIC VALVE STENOSIS, ETIOLOGY OF CARDIAC VALVE DISEASE UNSPECIFIED: ICD-10-CM

## 2024-12-11 PROCEDURE — 1101F PT FALLS ASSESS-DOCD LE1/YR: CPT | Mod: CPTII,S$GLB,, | Performed by: INTERNAL MEDICINE

## 2024-12-11 PROCEDURE — 3288F FALL RISK ASSESSMENT DOCD: CPT | Mod: CPTII,S$GLB,, | Performed by: INTERNAL MEDICINE

## 2024-12-11 PROCEDURE — 99204 OFFICE O/P NEW MOD 45 MIN: CPT | Mod: S$GLB,,, | Performed by: INTERNAL MEDICINE

## 2024-12-11 PROCEDURE — 3066F NEPHROPATHY DOC TX: CPT | Mod: CPTII,S$GLB,, | Performed by: INTERNAL MEDICINE

## 2024-12-11 PROCEDURE — 3061F NEG MICROALBUMINURIA REV: CPT | Mod: CPTII,S$GLB,, | Performed by: INTERNAL MEDICINE

## 2024-12-11 PROCEDURE — 3044F HG A1C LEVEL LT 7.0%: CPT | Mod: CPTII,S$GLB,, | Performed by: INTERNAL MEDICINE

## 2024-12-11 PROCEDURE — 99999 PR PBB SHADOW E&M-EST. PATIENT-LVL IV: CPT | Mod: PBBFAC,,, | Performed by: INTERNAL MEDICINE

## 2024-12-11 PROCEDURE — 3008F BODY MASS INDEX DOCD: CPT | Mod: CPTII,S$GLB,, | Performed by: INTERNAL MEDICINE

## 2024-12-11 PROCEDURE — 3078F DIAST BP <80 MM HG: CPT | Mod: CPTII,S$GLB,, | Performed by: INTERNAL MEDICINE

## 2024-12-11 PROCEDURE — 4010F ACE/ARB THERAPY RXD/TAKEN: CPT | Mod: CPTII,S$GLB,, | Performed by: INTERNAL MEDICINE

## 2024-12-11 PROCEDURE — 3074F SYST BP LT 130 MM HG: CPT | Mod: CPTII,S$GLB,, | Performed by: INTERNAL MEDICINE

## 2024-12-11 PROCEDURE — 1159F MED LIST DOCD IN RCRD: CPT | Mod: CPTII,S$GLB,, | Performed by: INTERNAL MEDICINE

## 2024-12-11 PROCEDURE — G2211 COMPLEX E/M VISIT ADD ON: HCPCS | Mod: S$GLB,,, | Performed by: INTERNAL MEDICINE

## 2024-12-11 PROCEDURE — 1126F AMNT PAIN NOTED NONE PRSNT: CPT | Mod: CPTII,S$GLB,, | Performed by: INTERNAL MEDICINE

## 2024-12-11 RX ORDER — LISINOPRIL 10 MG/1
10 TABLET ORAL DAILY
Qty: 90 TABLET | Refills: 3 | Status: SHIPPED | OUTPATIENT
Start: 2024-12-11 | End: 2025-12-11

## 2024-12-11 NOTE — PROGRESS NOTES
Subjective:   @Patient ID:  Mela Manriquez is a 64 y.o. female who presents for evaluation of No chief complaint on file.      HPI:        64 year old female with     Hyperlipidemia  on Lipitor 80 mg daily   Hypertension on lisinopril 20 mg daily   Prediabetes A1c now down to 5.3 from 6.2   Left bundle-branch block   Moderate to severe aortic stenosis   Peak velocity 3.8, mean gradient 35, JORDAN by VTI 0.9   Tiredness and weakness with low blood pressure today     Here for cardiac evaluation.  Has had heart murmur for many years.  I have reviewed the echocardiogram myself, likely bicuspid valve with fusion of non and left coronary cusp.  Significant calcification present.  Patient at this point does not have any symptoms concerning for severe aortic stenosis, euvolemic on examination, no episodes of syncope.   We have discussed getting echocardiogram and stress echo in 3 months to evaluate for severity and symptomatology associated with the aortic stenosis       Echocardiogram reviewed by me. Likely functional bicuspid valve with fusion of the left and non coronary cusp with significant calcification of the raphe.      Results for orders placed during the hospital encounter of 11/14/24    Echo    Interpretation Summary    Left Ventricle: The left ventricle is normal in size. There is normal systolic function with a visually estimated ejection fraction of 55 - 60%. There is normal diastolic function.    Right Ventricle: Normal right ventricular cavity size. Systolic function is normal.    Aortic Valve: The aortic valve is a trileaflet valve. There is severe aortic valve sclerosis. There is moderate to severe stenosis. Aortic valve area by VTI is 0.9 cm². Aortic valve peak velocity is 3.8 m/s. Mean gradient is 35.1 mmHg. The dimensionless index is 0.31. There is mild aortic regurgitation with a centrally directed jet.    Pulmonary Artery: No pulmonary hypertension. The estimated pulmonary artery systolic  pressure is 25 mmHg.    IVC/SVC: Normal venous pressure at 3 mmHg.      No results found for this or any previous visit.      No results found for this or any previous visit.      Please document below the medical necessity for continuous telemetry monitoring or discontinue the current order if appropriate.    Current rhythm from flowsheet:               Patient Active Problem List    Diagnosis Date Noted    High cholesterol 03/14/2024    Sciatica of left side 03/14/2024    Aortic valve stenosis 02/09/2022    History of robot-assisted laparoscopic hysterectomy/BSO/pelvic LND 11/30/2018    Endometrial cancer 10/22/2018    Fibromyalgia 03/13/2018    Type 2 diabetes mellitus without complication, without long-term current use of insulin 02/04/2016    Essential hypertension 01/05/2016    Nephrolithiasis 01/05/2016    Hypothyroidism 01/05/2016    Morbid obesity 01/05/2016    Allergic rhinitis 01/05/2016                    LAST HbA1c  Lab Results   Component Value Date    HGBA1C 5.2 09/17/2024       Lipid panel  Lab Results   Component Value Date    CHOL 173 03/07/2024    CHOL 171 03/27/2023    CHOL 165 02/18/2022     Lab Results   Component Value Date    HDL 43 03/07/2024    HDL 54 03/27/2023    HDL 47 02/18/2022     Lab Results   Component Value Date    LDLCALC 104.4 03/07/2024    LDLCALC 99.4 03/27/2023    LDLCALC 95.4 02/18/2022     Lab Results   Component Value Date    TRIG 128 03/07/2024    TRIG 88 03/27/2023    TRIG 113 02/18/2022     Lab Results   Component Value Date    CHOLHDL 24.9 03/07/2024    CHOLHDL 31.6 03/27/2023    CHOLHDL 28.5 02/18/2022            Review of Systems   Constitutional: Positive for malaise/fatigue. Negative for chills and fever.   HENT:  Negative for hearing loss and nosebleeds.    Eyes:  Negative for blurred vision.   Cardiovascular:  Negative for chest pain, leg swelling and palpitations.   Respiratory:  Negative for hemoptysis and shortness of breath.    Hematologic/Lymphatic: Negative  for bleeding problem.   Skin:  Negative for itching.   Musculoskeletal:  Negative for falls.   Gastrointestinal:  Negative for abdominal pain and hematochezia.   Genitourinary:  Negative for hematuria.   Neurological:  Negative for dizziness and loss of balance.   Psychiatric/Behavioral:  Negative for altered mental status and depression.        Objective:   Physical Exam  Constitutional:       Appearance: She is well-developed.   HENT:      Head: Normocephalic and atraumatic.   Eyes:      Conjunctiva/sclera: Conjunctivae normal.   Neck:      Vascular: No carotid bruit or JVD.   Cardiovascular:      Rate and Rhythm: Normal rate and regular rhythm.      Pulses:           Carotid pulses are 2+ on the right side and 2+ on the left side.       Radial pulses are 2+ on the right side and 2+ on the left side.      Heart sounds: Murmur heard.      No friction rub. No gallop.      Comments:  4/6 murmur at left sternal border with radiation to carotids.  Crescendo  Pulmonary:      Effort: Pulmonary effort is normal. No respiratory distress.      Breath sounds: Normal breath sounds. No stridor. No wheezing.   Musculoskeletal:      Cervical back: Neck supple.   Skin:     General: Skin is warm and dry.   Neurological:      Mental Status: She is alert and oriented to person, place, and time.   Psychiatric:         Behavior: Behavior normal.         Assessment:     1. Hypertension, unspecified type    2. Aortic valve stenosis, etiology of cardiac valve disease unspecified    3. Hyperlipidemia, unspecified hyperlipidemia type    4. Prediabetes        Plan:     -  likely moderate to severe bicuspid aortic stenosis, not symptomatic at this point.  Recommend repeating echocardiogram with stress testing in 3-4 months from now.  Euvolemic on examination.  Has baseline left bundle branch block.  -   Does have complain of generalized tiredness, blood pressure 90/60 today.  Recommend cutting down the dose of lisinopril to half at 10 mg  daily.  Check blood pressure every   Day-  continue Lipitor.  -   Not a candidate for aspirin therapy at this point  -  follow up in 4 months    Pertinent cardiac images and EKG reviewed independently.    Continue with current medical plan and lifestyle changes.  Return sooner for concerns or questions. If symptoms persist go to the ED  I have reviewed all pertinent data including patient's medical history in detail and updated the computerized patient record.     No orders of the defined types were placed in this encounter.      Follow up as scheduled.     She expressed verbal understanding and agreed with the plan    Patient's Medications   New Prescriptions    No medications on file   Previous Medications    ALBUTEROL (PROVENTIL/VENTOLIN HFA) 90 MCG/ACTUATION INHALER    USE 2 INHALATIONS BY MOUTH EVERY 6 HOURS AS NEEDED FOR WHEEZING  OR SHORTNESS OF BREATH    ATORVASTATIN (LIPITOR) 80 MG TABLET    TAKE 1 TABLET BY MOUTH ONCE  DAILY    B COMPLEX VITAMINS CAPSULE    Take 1 capsule by mouth once daily.    CALCIUM-VITAMIN D3 (OS-ROYAL 500 + D3) 500 MG(1,250MG) -200 UNIT PER TABLET    Take 1 tablet by mouth once daily.    CETIRIZINE (ZYRTEC) 10 MG TABLET    Take 10 mg by mouth once daily.    FISH OIL-OMEGA-3 FATTY ACIDS 300-1,000 MG CAPSULE    Take 1 capsule by mouth once daily.    FLUTICASONE PROPIONATE (FLONASE) 50 MCG/ACTUATION NASAL SPRAY    USE 2 SPRAYS IN BOTH NOSTRILS  ONCE DAILY    FLUTICASONE-SALMETEROL DISKUS INHALER 100-50 MCG    USE 1 INHALATION BY MOUTH INTO  THE LUNGS TWICE DAILY    GABAPENTIN (NEURONTIN) 300 MG CAPSULE    TAKE 1 CAPSULE BY MOUTH 3 TIMES  DAILY    IBUPROFEN (ADVIL,MOTRIN) 200 MG TABLET    Take 800 mg by mouth every 6 (six) hours as needed for Pain.    LEVOTHYROXINE (SYNTHROID) 50 MCG TABLET    TAKE 1 TABLET BY MOUTH BEFORE  BREAKFAST    LISINOPRIL (PRINIVIL,ZESTRIL) 20 MG TABLET    TAKE 1 TABLET BY MOUTH ONCE  DAILY    MULTIVIT-IRON-MIN-FOLIC ACID 3,500-18-0.4 UNIT-MG-MG ORAL CHEW    Take  by mouth once daily.     SEMAGLUTIDE (OZEMPIC) 1 MG/DOSE (4 MG/3 ML)    Inject 1 mg into the skin every 7 days.   Modified Medications    No medications on file   Discontinued Medications    No medications on file        Jose Miguel Linn M.D

## 2024-12-12 LAB
OHS QRS DURATION: 142 MS
OHS QTC CALCULATION: 449 MS

## 2025-03-23 PROBLEM — N18.31 CHRONIC KIDNEY DISEASE, STAGE 3A: Status: ACTIVE | Noted: 2025-03-23

## 2025-03-25 ENCOUNTER — HOSPITAL ENCOUNTER (OUTPATIENT)
Dept: CARDIOLOGY | Facility: HOSPITAL | Age: 65
Discharge: HOME | End: 2025-03-25
Attending: INTERNAL MEDICINE
Payer: MEDICARE

## 2025-03-25 VITALS — WEIGHT: 244 LBS | BODY MASS INDEX: 47.91 KG/M2 | HEIGHT: 60 IN

## 2025-03-25 DIAGNOSIS — I35.0 AORTIC VALVE STENOSIS, ETIOLOGY OF CARDIAC VALVE DISEASE UNSPECIFIED: ICD-10-CM

## 2025-03-25 LAB
APICAL FOUR CHAMBER EJECTION FRACTION: 65 %
APICAL TWO CHAMBER EJECTION FRACTION: 64 %
ASCENDING AORTA: 2.69 CM
AV INDEX (PROSTH): 0.38
AV MEAN GRADIENT: 28 MMHG
AV PEAK GRADIENT: 41 MMHG
AV VALVE AREA BY VELOCITY RATIO: 1.2 CM²
AV VALVE AREA: 1.1 CM²
AV VELOCITY RATIO: 0.41
BSA FOR ECHO PROCEDURE: 2.16 M2
CV ECHO LV RWT: 0.39 CM
CV STRESS BASE HR: 68 BPM
DIASTOLIC BLOOD PRESSURE: 60 MMHG
DOP CALC AO PEAK VEL: 3.2 M/S
DOP CALC AO VTI: 78.1 CM
DOP CALC LVOT AREA: 2.8 CM2
DOP CALC LVOT DIAMETER: 1.9 CM
DOP CALC LVOT PEAK VEL: 1.3 M/S
DOP CALC LVOT STROKE VOLUME: 83.9 CM3
DOP CALC MV VTI: 29 CM
DOP CALCLVOT PEAK VEL VTI: 29.6 CM
E WAVE DECELERATION TIME: 171 MSEC
E/A RATIO: 0.94
E/E' RATIO: 12 M/S
ECHO LV POSTERIOR WALL: 0.9 CM (ref 0.6–1.1)
FRACTIONAL SHORTENING: 39.1 % (ref 28–44)
INTERVENTRICULAR SEPTUM: 1.1 CM (ref 0.6–1.1)
IVC DIAMETER: 1.28 CM
LEFT ATRIUM AREA SYSTOLIC (APICAL 2 CHAMBER): 17.12 CM2
LEFT ATRIUM AREA SYSTOLIC (APICAL 4 CHAMBER): 15.99 CM2
LEFT ATRIUM VOLUME INDEX MOD: 22 ML/M2
LEFT ATRIUM VOLUME MOD: 45 ML
LEFT INTERNAL DIMENSION IN SYSTOLE: 2.8 CM (ref 2.1–4)
LEFT VENTRICLE DIASTOLIC VOLUME INDEX: 46.8 ML/M2
LEFT VENTRICLE DIASTOLIC VOLUME: 95 ML
LEFT VENTRICLE END DIASTOLIC VOLUME APICAL 2 CHAMBER: 73.24 ML
LEFT VENTRICLE END DIASTOLIC VOLUME APICAL 4 CHAMBER: 86.41 ML
LEFT VENTRICLE END SYSTOLIC VOLUME APICAL 2 CHAMBER: 46.04 ML
LEFT VENTRICLE END SYSTOLIC VOLUME APICAL 4 CHAMBER: 41.55 ML
LEFT VENTRICLE MASS INDEX: 78.2 G/M2
LEFT VENTRICLE SYSTOLIC VOLUME INDEX: 14.8 ML/M2
LEFT VENTRICLE SYSTOLIC VOLUME: 30 ML
LEFT VENTRICULAR INTERNAL DIMENSION IN DIASTOLE: 4.6 CM (ref 3.5–6)
LEFT VENTRICULAR MASS: 158.8 G
LV LATERAL E/E' RATIO: 12 M/S
LV SEPTAL E/E' RATIO: 12 M/S
LVED V (TEICH): 94.8 ML
LVES V (TEICH): 30.11 ML
LVOT MG: 4.35 MMHG
LVOT MV: 1.03 CM/S
MV MEAN GRADIENT: 1 MMHG
MV PEAK A VEL: 0.77 M/S
MV PEAK E VEL: 0.72 M/S
MV PEAK GRADIENT: 2 MMHG
MV STENOSIS PRESSURE HALF TIME: 49.73 MS
MV VALVE AREA BY CONTINUITY EQUATION: 2.89 CM2
MV VALVE AREA P 1/2 METHOD: 4.42 CM2
OHS CV CPX 1 MINUTE RECOVERY HEART RATE: 104 BPM
OHS CV CPX 85 PERCENT MAX PREDICTED HEART RATE MALE: 131
OHS CV CPX ESTIMATED METS: 4
OHS CV CPX MAX PREDICTED HEART RATE: 154
OHS CV CPX PATIENT IS FEMALE: 1
OHS CV CPX PATIENT IS MALE: 0
OHS CV CPX PEAK DIASTOLIC BLOOD PRESSURE: 60 MMHG
OHS CV CPX PEAK HEAR RATE: 134 BPM
OHS CV CPX PEAK RATE PRESSURE PRODUCT: NORMAL
OHS CV CPX PEAK SYSTOLIC BLOOD PRESSURE: 131 MMHG
OHS CV CPX PERCENT MAX PREDICTED HEART RATE ACHIEVED: 91
OHS CV CPX RATE PRESSURE PRODUCT PRESENTING: 8908
OHS CV RV/LV RATIO: 0.96 CM
OHS LV EJECTION FRACTION SIMPSONS BIPLANE MOD: 65 %
PISA MRMAX VEL: 4.68 M/S
PISA TR MAX VEL: 1.9 M/S
PULM VEIN S/D RATIO: 1.14
PV PEAK D VEL: 0.36 M/S
PV PEAK S VEL: 0.41 M/S
RA PRESSURE ESTIMATED: 3 MMHG
RA VOL SYS: 38.98 ML
RIGHT ATRIAL AREA: 14.2 CM2
RIGHT ATRIUM VOLUME AREA LENGTH APICAL 4 CHAMBER: 36.73 ML
RIGHT VENTRICLE DIASTOLIC BASEL DIMENSION: 4.4 CM
RV TB RVSP: 5 MMHG
RV TISSUE DOPPLER FREE WALL SYSTOLIC VELOCITY 1 (APICAL 4 CHAMBER VIEW): 12.37 CM/S
SINUS: 2.7 CM
STJ: 2.43 CM
STRESS ECHO POST EXERCISE DUR MIN: 1 MINUTES
STRESS ECHO POST EXERCISE DUR SEC: 34 SECONDS
SYSTOLIC BLOOD PRESSURE: 131 MMHG
TDI LATERAL: 0.06 M/S
TDI SEPTAL: 0.06 M/S
TDI: 0.06 M/S
TR MAX PG: 14 MMHG
TRICUSPID ANNULAR PLANE SYSTOLIC EXCURSION: 2.57 CM
TV REST PULMONARY ARTERY PRESSURE: 17 MMHG
Z-SCORE OF LEFT VENTRICULAR DIMENSION IN END DIASTOLE: -2.68
Z-SCORE OF LEFT VENTRICULAR DIMENSION IN END SYSTOLE: -2.18

## 2025-03-25 PROCEDURE — 93351 STRESS TTE COMPLETE: CPT | Mod: 26,,, | Performed by: INTERNAL MEDICINE

## 2025-03-25 PROCEDURE — 93325 DOPPLER ECHO COLOR FLOW MAPG: CPT | Mod: 26,,, | Performed by: INTERNAL MEDICINE

## 2025-03-25 PROCEDURE — 93325 DOPPLER ECHO COLOR FLOW MAPG: CPT

## 2025-03-25 PROCEDURE — 93320 DOPPLER ECHO COMPLETE: CPT | Mod: 26,,, | Performed by: INTERNAL MEDICINE

## 2025-04-02 ENCOUNTER — OFFICE VISIT (OUTPATIENT)
Dept: CARDIOLOGY | Facility: CLINIC | Age: 65
End: 2025-04-02
Payer: MEDICARE

## 2025-04-02 VITALS
HEIGHT: 60 IN | OXYGEN SATURATION: 98 % | DIASTOLIC BLOOD PRESSURE: 63 MMHG | BODY MASS INDEX: 48.11 KG/M2 | WEIGHT: 245.06 LBS | SYSTOLIC BLOOD PRESSURE: 128 MMHG | HEART RATE: 72 BPM

## 2025-04-02 DIAGNOSIS — I10 ESSENTIAL HYPERTENSION, BENIGN: Chronic | ICD-10-CM

## 2025-04-02 DIAGNOSIS — E78.5 HYPERLIPIDEMIA, UNSPECIFIED HYPERLIPIDEMIA TYPE: ICD-10-CM

## 2025-04-02 DIAGNOSIS — E78.00 HIGH CHOLESTEROL: ICD-10-CM

## 2025-04-02 DIAGNOSIS — R73.03 PREDIABETES: ICD-10-CM

## 2025-04-02 DIAGNOSIS — I35.0 AORTIC VALVE STENOSIS, ETIOLOGY OF CARDIAC VALVE DISEASE UNSPECIFIED: Primary | ICD-10-CM

## 2025-04-02 PROCEDURE — 1126F AMNT PAIN NOTED NONE PRSNT: CPT | Mod: CPTII,S$GLB,, | Performed by: INTERNAL MEDICINE

## 2025-04-02 PROCEDURE — G2211 COMPLEX E/M VISIT ADD ON: HCPCS | Mod: S$GLB,,, | Performed by: INTERNAL MEDICINE

## 2025-04-02 PROCEDURE — 3078F DIAST BP <80 MM HG: CPT | Mod: CPTII,S$GLB,, | Performed by: INTERNAL MEDICINE

## 2025-04-02 PROCEDURE — 3008F BODY MASS INDEX DOCD: CPT | Mod: CPTII,S$GLB,, | Performed by: INTERNAL MEDICINE

## 2025-04-02 PROCEDURE — 4010F ACE/ARB THERAPY RXD/TAKEN: CPT | Mod: CPTII,S$GLB,, | Performed by: INTERNAL MEDICINE

## 2025-04-02 PROCEDURE — 99999 PR PBB SHADOW E&M-EST. PATIENT-LVL II: CPT | Mod: PBBFAC,,, | Performed by: INTERNAL MEDICINE

## 2025-04-02 PROCEDURE — 3074F SYST BP LT 130 MM HG: CPT | Mod: CPTII,S$GLB,, | Performed by: INTERNAL MEDICINE

## 2025-04-02 PROCEDURE — 1101F PT FALLS ASSESS-DOCD LE1/YR: CPT | Mod: CPTII,S$GLB,, | Performed by: INTERNAL MEDICINE

## 2025-04-02 PROCEDURE — 99214 OFFICE O/P EST MOD 30 MIN: CPT | Mod: S$GLB,,, | Performed by: INTERNAL MEDICINE

## 2025-04-02 PROCEDURE — 3288F FALL RISK ASSESSMENT DOCD: CPT | Mod: CPTII,S$GLB,, | Performed by: INTERNAL MEDICINE

## 2025-04-02 PROCEDURE — 3066F NEPHROPATHY DOC TX: CPT | Mod: CPTII,S$GLB,, | Performed by: INTERNAL MEDICINE

## 2025-04-02 PROCEDURE — 3044F HG A1C LEVEL LT 7.0%: CPT | Mod: CPTII,S$GLB,, | Performed by: INTERNAL MEDICINE

## 2025-04-02 PROCEDURE — 3061F NEG MICROALBUMINURIA REV: CPT | Mod: CPTII,S$GLB,, | Performed by: INTERNAL MEDICINE

## 2025-04-02 NOTE — PROGRESS NOTES
Subjective:   @Patient ID:  Mela Manriquez is a 64 y.o. female who presents for evaluation of No chief complaint on file.      HPI:        64 year old female with     Hyperlipidemia LDL 94 on Lipitor 80 mg daily   Hypertension on lisinopril 10 mg daily   Prediabetes A1c now down to 5.3 from 6.2   Left bundle-branch block   Moderate aortic stenosis   Peak velocity 3.2, mean gradient 28, JORDAN by VTI 1.1.  Bicuspid aortic valve with fusion of left and non coronary cusps      Improvement in dizziness/lethargy with decreasing the dose of lisinopril.  Denies any exertional symptoms although has significant weight gain for which she recently started on Ozempic.    We discussed stress echocardiogram results with the patient.  Denies any chest pain/pressure/dizziness/syncope/heart failure symptoms.  Euvolemic on examination.    Results for orders placed during the hospital encounter of 11/14/24    Echo    Interpretation Summary    Left Ventricle: The left ventricle is normal in size. There is normal systolic function with a visually estimated ejection fraction of 55 - 60%. There is normal diastolic function.    Right Ventricle: Normal right ventricular cavity size. Systolic function is normal.    Aortic Valve: The aortic valve is a trileaflet valve. There is severe aortic valve sclerosis. There is moderate to severe stenosis. Aortic valve area by VTI is 0.9 cm². Aortic valve peak velocity is 3.8 m/s. Mean gradient is 35.1 mmHg. The dimensionless index is 0.31. There is mild aortic regurgitation with a centrally directed jet.    Pulmonary Artery: No pulmonary hypertension. The estimated pulmonary artery systolic pressure is 25 mmHg.    IVC/SVC: Normal venous pressure at 3 mmHg.      No results found for this or any previous visit.      No results found for this or any previous visit.      Please document below the medical necessity for continuous telemetry monitoring or discontinue the current order if appropriate.    Current  rhythm from flowsheet:               Patient Active Problem List    Diagnosis Date Noted    Chronic kidney disease, stage 3a 03/23/2025    High cholesterol 03/14/2024    Sciatica of left side 03/14/2024    Aortic valve stenosis 02/09/2022    History of robot-assisted laparoscopic hysterectomy/BSO/pelvic LND 11/30/2018    Endometrial cancer 10/22/2018    Fibromyalgia 03/13/2018    Type 2 diabetes mellitus without complication, without long-term current use of insulin 02/04/2016    Essential hypertension 01/05/2016    Nephrolithiasis 01/05/2016    Hypothyroidism 01/05/2016    Morbid obesity 01/05/2016    Allergic rhinitis 01/05/2016                    LAST HbA1c  Lab Results   Component Value Date    HGBA1C 5.2 03/17/2025       Lipid panel  Lab Results   Component Value Date    CHOL 164 03/17/2025    CHOL 173 03/07/2024    CHOL 171 03/27/2023     Lab Results   Component Value Date    HDL 53 03/17/2025    HDL 43 03/07/2024    HDL 54 03/27/2023     Lab Results   Component Value Date    LDLCALC 94.6 03/17/2025    LDLCALC 104.4 03/07/2024    LDLCALC 99.4 03/27/2023     Lab Results   Component Value Date    TRIG 82 03/17/2025    TRIG 128 03/07/2024    TRIG 88 03/27/2023     Lab Results   Component Value Date    CHOLHDL 32.3 03/17/2025    CHOLHDL 24.9 03/07/2024    CHOLHDL 31.6 03/27/2023            Review of Systems   Constitutional: Positive for malaise/fatigue. Negative for chills and fever.   HENT:  Negative for hearing loss and nosebleeds.    Eyes:  Negative for blurred vision.   Cardiovascular:  Negative for chest pain, leg swelling and palpitations.   Respiratory:  Negative for hemoptysis and shortness of breath.    Hematologic/Lymphatic: Negative for bleeding problem.   Skin:  Negative for itching.   Musculoskeletal:  Negative for falls.   Gastrointestinal:  Negative for abdominal pain and hematochezia.   Genitourinary:  Negative for hematuria.   Neurological:  Negative for dizziness and loss of balance.    Psychiatric/Behavioral:  Negative for altered mental status and depression.        Objective:   Physical Exam  Constitutional:       Appearance: She is well-developed.   HENT:      Head: Normocephalic and atraumatic.   Eyes:      Conjunctiva/sclera: Conjunctivae normal.   Neck:      Vascular: No carotid bruit or JVD.   Cardiovascular:      Rate and Rhythm: Normal rate and regular rhythm.      Pulses:           Carotid pulses are 2+ on the right side and 2+ on the left side.       Radial pulses are 2+ on the right side and 2+ on the left side.      Heart sounds: Murmur heard.      No friction rub. No gallop.      Comments:  4/6 murmur at left sternal border with radiation to carotids.  Crescendo  Pulmonary:      Effort: Pulmonary effort is normal. No respiratory distress.      Breath sounds: Normal breath sounds. No stridor. No wheezing.   Musculoskeletal:      Cervical back: Neck supple.   Skin:     General: Skin is warm and dry.   Neurological:      Mental Status: She is alert and oriented to person, place, and time.   Psychiatric:         Behavior: Behavior normal.         Assessment:     1. Aortic valve stenosis, etiology of cardiac valve disease unspecified    2. High cholesterol    3. Essential hypertension    4. Prediabetes    5. Hyperlipidemia, unspecified hyperlipidemia type        Plan:     -  bicuspid aortic valve with stenosis in the moderate range with aortic valve area 1.2 mean gradient 28 dimensionless index 0.38.  Recommend repeating echocardiogram in 6 months.  Call my office if any symptoms concerning for angina/shortness of breath/heart failure symptoms  -no restrictions at this point.  Can start physical therapy  -LDL 94, continue Lipitor 80 mg daily.  No significant wall motion abnormalities on echocardiogram.  -decreased exercise capacity likely from multiple sinus problems and bilateral knee joint pain.  Was able to complete 4 Mets on treadmill.  -continue lisinopril 10 mg daily for blood  pressure control.  May need to decrease dose if has significant weight loss with the Ozempic  -follow up within 6 months with repeat lipid panel and echocardiogram.    Pertinent cardiac images and EKG reviewed independently.    Continue with current medical plan and lifestyle changes.  Return sooner for concerns or questions. If symptoms persist go to the ED  I have reviewed all pertinent data including patient's medical history in detail and updated the computerized patient record.     Orders Placed This Encounter   Procedures    Lipid Panel     Standing Status:   Future     Expected Date:   4/2/2025     Expiration Date:   7/1/2026     Send normal result to authorizing provider's In Basket if patient is active on MyChart::   Yes    Echo     Standing Status:   Future     Expected Date:   10/2/2025     Expiration Date:   4/2/2026     Release to patient:   Immediate       Follow up as scheduled.     She expressed verbal understanding and agreed with the plan    Patient's Medications   New Prescriptions    No medications on file   Previous Medications    ALBUTEROL (PROVENTIL/VENTOLIN HFA) 90 MCG/ACTUATION INHALER    USE 2 INHALATIONS BY MOUTH EVERY 6 HOURS AS NEEDED FOR WHEEZING  OR SHORTNESS OF BREATH    ATORVASTATIN (LIPITOR) 80 MG TABLET    TAKE 1 TABLET BY MOUTH ONCE  DAILY    B COMPLEX VITAMINS CAPSULE    Take 1 capsule by mouth once daily.    CALCIUM-VITAMIN D3 (OS-ROYAL 500 + D3) 500 MG(1,250MG) -200 UNIT PER TABLET    Take 1 tablet by mouth once daily.    CETIRIZINE (ZYRTEC) 10 MG TABLET    Take 10 mg by mouth once daily.    FISH OIL-OMEGA-3 FATTY ACIDS 300-1,000 MG CAPSULE    Take 1 capsule by mouth once daily.    FLUTICASONE PROPIONATE (FLONASE) 50 MCG/ACTUATION NASAL SPRAY    USE 2 SPRAYS IN BOTH NOSTRILS  ONCE DAILY    FLUTICASONE-SALMETEROL DISKUS INHALER 100-50 MCG    USE 1 INHALATION BY MOUTH INTO  THE LUNGS TWICE DAILY    GABAPENTIN (NEURONTIN) 300 MG CAPSULE    TAKE 1 CAPSULE BY MOUTH 3 TIMES  DAILY     IBUPROFEN (ADVIL,MOTRIN) 200 MG TABLET    Take 800 mg by mouth every 6 (six) hours as needed for Pain.    LEVOTHYROXINE (SYNTHROID) 50 MCG TABLET    TAKE 1 TABLET BY MOUTH BEFORE  BREAKFAST    LISINOPRIL 10 MG TABLET    Take 1 tablet (10 mg total) by mouth once daily.    MULTIVIT-IRON-MIN-FOLIC ACID 3,500-18-0.4 UNIT-MG-MG ORAL CHEW    Take by mouth once daily.     SEMAGLUTIDE (OZEMPIC) 1 MG/DOSE (4 MG/3 ML)    Inject 1 mg into the skin every 7 days.   Modified Medications    No medications on file   Discontinued Medications    No medications on file        Jose Miguel Linn M.D

## (undated) DEVICE — SOL WATER STRL IRR 1000ML

## (undated) DEVICE — POSITIONER HEAD ADULT

## (undated) DEVICE — COVER TIP CURVED SCISSORS XI

## (undated) DEVICE — ADHESIVE DERMABOND ADVANCED

## (undated) DEVICE — KIT WING PAD POSITIONING

## (undated) DEVICE — GLOVE BIOGEL SKINSENSE PI 6.5

## (undated) DEVICE — SEE MEDLINE ITEM 156923

## (undated) DEVICE — SOL NS 1000CC

## (undated) DEVICE — SOL CLEARIFY VISUALIZATION LAP

## (undated) DEVICE — ELECTRODE REM PLYHSV RETURN 9

## (undated) DEVICE — SYR 10CC LUER LOCK

## (undated) DEVICE — DRAPE ARM DAVINCI XI

## (undated) DEVICE — SOL ELECTROLUBE ANTI-STIC

## (undated) DEVICE — MANIPULATOR VCARE PLUS 34MM

## (undated) DEVICE — SUT MCRYL PLUS 4-0 PS2 27IN

## (undated) DEVICE — SEAL UNIVERSAL 5MM-8MM XI

## (undated) DEVICE — SET TRI-LUMEN FILTERED TUBE

## (undated) DEVICE — NDL INSUF ULTRA VERESS 120MM

## (undated) DEVICE — DRAPE COLUMN DAVINCI XI

## (undated) DEVICE — SUT PROLENE 0 CT1 30IN BLUE

## (undated) DEVICE — NDL PNEUMOPERITONEUM 150MM

## (undated) DEVICE — APPLICATOR ARISTA FLEX XL

## (undated) DEVICE — SUT V-LOC 180 ABD 2/0 GS-21

## (undated) DEVICE — PORT ACCESS 8MM W/120MM LOW

## (undated) DEVICE — JELLY KY LUBRICATING 5G PACKET

## (undated) DEVICE — IRRIGATOR ENDOSCOPY DISP.

## (undated) DEVICE — INSERT CUSHIONPRONE VIEW LARGE

## (undated) DEVICE — POWDER ARISTA AH 3G

## (undated) DEVICE — DEVICE ANC SW STAT FOLEY 6-24

## (undated) DEVICE — OBTURATOR BLADELESS 8MM XI